# Patient Record
Sex: FEMALE | Race: WHITE | NOT HISPANIC OR LATINO | Employment: OTHER | URBAN - METROPOLITAN AREA
[De-identification: names, ages, dates, MRNs, and addresses within clinical notes are randomized per-mention and may not be internally consistent; named-entity substitution may affect disease eponyms.]

---

## 2017-01-02 ENCOUNTER — TELEPHONE (OUTPATIENT)
Dept: INTERNAL MEDICINE | Facility: CLINIC | Age: 77
End: 2017-01-02

## 2017-01-02 ENCOUNTER — INFUSION THERAPY VISIT (OUTPATIENT)
Dept: INFUSION THERAPY | Facility: CLINIC | Age: 77
End: 2017-01-02
Attending: INTERNAL MEDICINE
Payer: MEDICARE

## 2017-01-02 VITALS
BODY MASS INDEX: 19.12 KG/M2 | OXYGEN SATURATION: 99 % | RESPIRATION RATE: 18 BRPM | SYSTOLIC BLOOD PRESSURE: 107 MMHG | WEIGHT: 94.7 LBS | HEART RATE: 68 BPM | DIASTOLIC BLOOD PRESSURE: 61 MMHG | TEMPERATURE: 98 F

## 2017-01-02 DIAGNOSIS — C83.30 DIFFUSE LARGE B-CELL LYMPHOMA, UNSPECIFIED BODY REGION (H): ICD-10-CM

## 2017-01-02 DIAGNOSIS — Z94.81 STATUS POST BONE MARROW TRANSPLANT (H): ICD-10-CM

## 2017-01-02 DIAGNOSIS — M81.0 OSTEOPOROSIS: Primary | ICD-10-CM

## 2017-01-02 LAB
CALCIUM SERPL-MCNC: 9.6 MG/DL (ref 8.5–10.1)
CREAT SERPL-MCNC: 0.82 MG/DL (ref 0.52–1.04)
GFR SERPL CREATININE-BSD FRML MDRD: 68 ML/MIN/1.7M2

## 2017-01-02 PROCEDURE — 96365 THER/PROPH/DIAG IV INF INIT: CPT

## 2017-01-02 PROCEDURE — 36415 COLL VENOUS BLD VENIPUNCTURE: CPT | Performed by: INTERNAL MEDICINE

## 2017-01-02 PROCEDURE — 96366 THER/PROPH/DIAG IV INF ADDON: CPT

## 2017-01-02 PROCEDURE — 82565 ASSAY OF CREATININE: CPT | Performed by: INTERNAL MEDICINE

## 2017-01-02 PROCEDURE — 82310 ASSAY OF CALCIUM: CPT | Performed by: INTERNAL MEDICINE

## 2017-01-02 PROCEDURE — 25000128 H RX IP 250 OP 636: Performed by: INTERNAL MEDICINE

## 2017-01-02 RX ORDER — ZOLEDRONIC ACID 5 MG/100ML
5 INJECTION, SOLUTION INTRAVENOUS ONCE
Status: COMPLETED | OUTPATIENT
Start: 2017-01-02 | End: 2017-01-02

## 2017-01-02 RX ADMIN — SODIUM CHLORIDE 100 ML: 9 INJECTION, SOLUTION INTRAVENOUS at 13:57

## 2017-01-02 RX ADMIN — ZOLEDRONIC ACID 5 MG: 5 INJECTION, SOLUTION INTRAVENOUS at 13:40

## 2017-01-02 ASSESSMENT — PAIN SCALES - GENERAL: PAINLEVEL: NO PAIN (0)

## 2017-01-02 NOTE — TELEPHONE ENCOUNTER
----- Message from Brian Roman MD sent at 1/2/2017  1:08 PM CST -----  Labs are ok, continue as she is doing.

## 2017-01-02 NOTE — TELEPHONE ENCOUNTER
Patient called back and I gave her the message. She was happy to hear   Her results and had no questions at this time.       Thank you,  Savanna Olmstead   for [facility]

## 2017-01-02 NOTE — PROGRESS NOTES
Patient had labs drawn. Calcium-9.6 and Creat-0.82. Tolerated Boniva infusion well. Medication information sheet on Boniva given to patient. Discharged in stable condition.

## 2017-04-17 ENCOUNTER — TELEPHONE (OUTPATIENT)
Dept: INTERNAL MEDICINE | Facility: CLINIC | Age: 77
End: 2017-04-17

## 2017-05-02 DIAGNOSIS — C83.30 DIFFUSE LARGE B-CELL LYMPHOMA, UNSPECIFIED BODY REGION (H): ICD-10-CM

## 2017-05-02 LAB
ALBUMIN SERPL-MCNC: 3.8 G/DL (ref 3.4–5)
ALP SERPL-CCNC: 64 U/L (ref 40–150)
ALT SERPL W P-5'-P-CCNC: 21 U/L (ref 0–50)
ANION GAP SERPL CALCULATED.3IONS-SCNC: 8 MMOL/L (ref 3–14)
AST SERPL W P-5'-P-CCNC: 17 U/L (ref 0–45)
BASOPHILS # BLD AUTO: 0 10E9/L (ref 0–0.2)
BASOPHILS NFR BLD AUTO: 0.2 %
BILIRUB SERPL-MCNC: 0.5 MG/DL (ref 0.2–1.3)
BUN SERPL-MCNC: 15 MG/DL (ref 7–30)
CALCIUM SERPL-MCNC: 9.3 MG/DL (ref 8.5–10.1)
CHLORIDE SERPL-SCNC: 103 MMOL/L (ref 94–109)
CO2 SERPL-SCNC: 31 MMOL/L (ref 20–32)
CREAT SERPL-MCNC: 0.75 MG/DL (ref 0.52–1.04)
DIFFERENTIAL METHOD BLD: ABNORMAL
EOSINOPHIL # BLD AUTO: 0.1 10E9/L (ref 0–0.7)
EOSINOPHIL NFR BLD AUTO: 1.3 %
ERYTHROCYTE [DISTWIDTH] IN BLOOD BY AUTOMATED COUNT: 12.6 % (ref 10–15)
GFR SERPL CREATININE-BSD FRML MDRD: 75 ML/MIN/1.7M2
GLUCOSE SERPL-MCNC: 101 MG/DL (ref 70–99)
HCT VFR BLD AUTO: 41.6 % (ref 35–47)
HGB BLD-MCNC: 13.3 G/DL (ref 11.7–15.7)
IMM GRANULOCYTES # BLD: 0 10E9/L (ref 0–0.4)
IMM GRANULOCYTES NFR BLD: 0.2 %
LDH SERPL L TO P-CCNC: 188 U/L (ref 81–234)
LYMPHOCYTES # BLD AUTO: 1.5 10E9/L (ref 0.8–5.3)
LYMPHOCYTES NFR BLD AUTO: 27 %
MCH RBC QN AUTO: 32.9 PG (ref 26.5–33)
MCHC RBC AUTO-ENTMCNC: 32 G/DL (ref 31.5–36.5)
MCV RBC AUTO: 103 FL (ref 78–100)
MONOCYTES # BLD AUTO: 0.5 10E9/L (ref 0–1.3)
MONOCYTES NFR BLD AUTO: 9.4 %
NEUTROPHILS # BLD AUTO: 3.4 10E9/L (ref 1.6–8.3)
NEUTROPHILS NFR BLD AUTO: 61.9 %
PLATELET # BLD AUTO: 163 10E9/L (ref 150–450)
POTASSIUM SERPL-SCNC: 4.2 MMOL/L (ref 3.4–5.3)
PROT SERPL-MCNC: 6.9 G/DL (ref 6.8–8.8)
RBC # BLD AUTO: 4.04 10E12/L (ref 3.8–5.2)
SODIUM SERPL-SCNC: 142 MMOL/L (ref 133–144)
WBC # BLD AUTO: 5.5 10E9/L (ref 4–11)

## 2017-05-02 PROCEDURE — 80053 COMPREHEN METABOLIC PANEL: CPT | Performed by: INTERNAL MEDICINE

## 2017-05-02 PROCEDURE — 36415 COLL VENOUS BLD VENIPUNCTURE: CPT | Performed by: INTERNAL MEDICINE

## 2017-05-02 PROCEDURE — 85025 COMPLETE CBC W/AUTO DIFF WBC: CPT | Performed by: INTERNAL MEDICINE

## 2017-05-02 PROCEDURE — 83615 LACTATE (LD) (LDH) ENZYME: CPT | Performed by: INTERNAL MEDICINE

## 2017-05-09 ENCOUNTER — ONCOLOGY VISIT (OUTPATIENT)
Dept: ONCOLOGY | Facility: CLINIC | Age: 77
End: 2017-05-09
Payer: MEDICARE

## 2017-05-09 VITALS
WEIGHT: 93 LBS | HEIGHT: 59 IN | RESPIRATION RATE: 16 BRPM | HEART RATE: 86 BPM | SYSTOLIC BLOOD PRESSURE: 104 MMHG | TEMPERATURE: 97.1 F | BODY MASS INDEX: 18.75 KG/M2 | DIASTOLIC BLOOD PRESSURE: 64 MMHG | OXYGEN SATURATION: 99 %

## 2017-05-09 DIAGNOSIS — E89.0 POSTOPERATIVE HYPOTHYROIDISM: ICD-10-CM

## 2017-05-09 DIAGNOSIS — M81.0 OSTEOPOROSIS: ICD-10-CM

## 2017-05-09 DIAGNOSIS — C83.30 DIFFUSE LARGE B-CELL LYMPHOMA, UNSPECIFIED BODY REGION (H): Primary | ICD-10-CM

## 2017-05-09 PROCEDURE — 99214 OFFICE O/P EST MOD 30 MIN: CPT | Performed by: INTERNAL MEDICINE

## 2017-05-09 ASSESSMENT — PAIN SCALES - GENERAL: PAINLEVEL: MILD PAIN (3)

## 2017-05-09 NOTE — MR AVS SNAPSHOT
After Visit Summary   5/9/2017    Lilli Russo    MRN: [id number]           Patient Information     Date Of Birth          1940        Visit Information        Provider Department      5/9/2017 1:00 PM Wayne Perez MD [facility]        Today's Diagnoses     Diffuse large B-cell lymphoma, unspecified body region (H)    -  1    Osteoporosis        Postoperative hypothyroidism          Care Instructions      Please follow up in 6 months with labs.      Follow Up Date/Time:   Please come 15-30 minutes prior to follow up appointment for labs.    If you have any questions or concerns please feel free to call.    Parish Rodney, RN, BSN   Oncology Care Coordinator RN  [facility]  [phone]            Follow-ups after your visit        Follow-up notes from your care team     Return in about 6 months (around 11/9/2017) for Blood work before next appointment.      Your next 10 appointments already scheduled     Jun 09, 2017  1:00 PM CDT   Office Visit with Brian Roman MD   [facility] ([facility])    [address] [place] [id number] [phone]           Bring a current list of meds and any records pertaining to this visit.  For Physicals, please bring immunization records and any forms needing to be filled out.  Please arrive 10 minutes early to complete paperwork.              Future tests that were ordered for you today     Open Future Orders        Priority Expected Expires Ordered    CBC with platelets differential Routine 11/9/2017 5/9/2018 5/9/2017    Comprehensive metabolic panel Routine 11/9/2017 5/9/2018 5/9/2017    Lactate Dehydrogenase Routine 11/9/2017 5/9/2018 5/9/2017            Who to contact     If you have questions or need follow up information about today's clinic visit or your schedule please contact [facility] directly at [phone].  Normal or non-critical lab and imaging results will  "be communicated to you by MyChart, letter or phone within 4 business days after the clinic has received the results. If you do not hear from us within 7 days, please contact the clinic through [employer] or phone. If you have a critical or abnormal lab result, we will notify you by phone as soon as possible.  Submit refill requests through [employer] or call your pharmacy and they will forward the refill request to us. Please allow 3 business days for your refill to be completed.          Additional Information About Your Visit        Booster Information     Booster lets you send messages to your doctor, view your test results, renew your prescriptions, schedule appointments and more. To sign up, go to [email or web address] . Click on \"Log in\" on the left side of the screen, which will take you to the Welcome page. Then click on \"Sign up Now\" on the right side of the page.     You will be asked to enter the access code listed below, as well as some personal information. Please follow the directions to create your username and password.     Your access code is: [email or web address]  Expires: 2017  1:11 PM     Your access code will  in 90 days. If you need help or a new code, please call your [place] clinic or [phone].        Care EveryWhere ID     This is your Care EveryWhere ID. This could be used by other organizations to access your [place] medical records  [id number]        Your Vitals Were     Pulse Temperature Respirations Height Pulse Oximetry BMI (Body Mass Index)    86 97.1  F (36.2  C) (Temporal) 16 1.499 m (4' 11\") 99% 18.78 kg/m2       Blood Pressure from Last 3 Encounters:   17 104/64   17 107/61   16 104/66    Weight from Last 3 Encounters:   17 42.2 kg (93 lb)   17 43 kg (94 lb 11.2 oz)   16 43.1 kg (95 lb)               Primary Care Provider Office Phone # Fax #    Brian Roman [phone]       [address] " [address] [place] [address]        Thank you!     Thank you for choosing [facility]  for your care. Our goal is always to provide you with excellent care. Hearing back from our patients is one way we can continue to improve our services. Please take a few minutes to complete the written survey that you may receive in the mail after your visit with us. Thank you!             Your Updated Medication List - Protect others around you: Learn how to safely use, store and throw away your medicines at www.disposemymeds.org.          This list is accurate as of: 5/9/17  1:11 PM.  Always use your most recent med list.                   Brand Name Dispense Instructions for use    acetaminophen 325 MG tablet    TYLENOL    100 tablet    Take 2 tablets (650 mg) by mouth every 4 hours as needed for pain       levothyroxine 50 MCG tablet    SYNTHROID/LEVOTHROID    90 tablet    Take 1 tablet (50 mcg) by mouth daily       MULTIVITAMIN TABS   OR      1 TABLET DAILY       TUMS 500 MG chewable tablet   Generic drug:  calcium carbonate      Take 2 chew tab by mouth daily as needed       VITAMIN D-3 PO      Take 1,000 Units by mouth daily.

## 2017-05-09 NOTE — PROGRESS NOTES
Hematology/ Oncology Follow-up Visit:  May 9, 2017    Reason for Visit:   Chief Complaint   Patient presents with     Oncology Clinic Visit     6 month follow up for Diffuse large B-cell lymphoma     Results     Labs 5/5/2017       Oncologic History:  Diffuse large B cell lymphoma (H)  Lilli Russo is known with known history of Non Hodgkin's lymphoma since 2013 when she felt feverish, had chills, poor taste, fatigue, cough, not too much sputum production, indigestion, early satiety, night sweats.  She was found to have pancytopenia with white count 2.4, hemoglobin 10.2, MCV 92, platelets 74, absolute neutrophil count 0.9, diff not significant without premature cells..   CT chest, abdomen and pelvis 09/16/2013:  Mild splenomegaly.  Spleen measures 13.2 cm.  Further extensive infectious disease work up was negative. 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 She was seen by Dr. Fulelr and is offered RCHOP [id number]/26/2013 with IT prophylaxis 12 mg MTX  On 9/26 with negative LP and brain MRI. Her presenting LDH is high >900, she received 1 dose of Rasburicase on 9/28/2013. She had RCHOP from 10/2013 to 1/2014. Repeat BM 11/26/2013 - negative for lymphoma by morphology and flow, cyto is nl.  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         Interval History:  Patient is here today for follow-up. She has been feeling well without any significant complaints of new pain, nausea vomiting diarrhea. She denies any weight loss or fever or chills. She was recently diagnosed with osteoporosis. She was started on IV treatment for osteoporosis. Since then she's been having increasing jaw pain.    Review Of Systems:  Constitutional: Negative for fever, chills, and night sweats.  Skin: negative.  Eyes: negative.  Ears/Nose/Throat: Jaw pain as mentioned above  Respiratory: No shortness of breath, dyspnea on exertion, cough, or  "hemoptysis.  Cardiovascular: negative.  Gastrointestinal: negative.  Genitourinary: negative.  Musculoskeletal: negative.  Neurologic: negative.  Psychiatric: negative.  Hematologic/Lymphatic/Immunologic: negative.  Endocrine: negative.    All other ROS negative unless mentioned in interval history.    Past medical, social, surgical, and family histories reviewed.    Allergies:  Allergies as of 05/09/2017 - Rick as Reviewed 05/09/2017   Allergen Reaction Noted     Ceftazidime Rash 09/24/2013       Current Medications:  Current Outpatient Prescriptions   Medication Sig Dispense Refill     levothyroxine (SYNTHROID, LEVOTHROID) 50 MCG tablet Take 1 tablet (50 mcg) by mouth daily 90 tablet 3     calcium carbonate (TUMS) 500 MG chewable tablet Take 2 chew tab by mouth daily as needed       Cholecalciferol (VITAMIN D-3 PO) Take 1,000 Units by mouth daily.        acetaminophen (TYLENOL) 325 MG tablet Take 2 tablets (650 mg) by mouth every 4 hours as needed for pain 100 tablet 0     MULTIVITAMIN TABS   OR 1 TABLET DAILY          Physical Exam:  /64 (BP Location: Right arm, Patient Position: Chair, Cuff Size: Adult Regular)  Pulse 86  Temp 97.1  F (36.2  C) (Temporal)  Resp 16  Ht 1.499 m (4' 11\")  Wt 42.2 kg (93 lb)  SpO2 99%  BMI 18.78 kg/m2  Wt Readings from Last 12 Encounters:   05/09/17 42.2 kg (93 lb)   01/02/17 43 kg (94 lb 11.2 oz)   12/23/16 43.1 kg (95 lb)   11/08/16 42.2 kg (93 lb)   06/07/16 40.8 kg (90 lb)   05/12/16 41.3 kg (91 lb 1.6 oz)   05/05/16 41.3 kg (91 lb)   12/01/15 41.9 kg (92 lb 4.8 oz)   09/22/15 40.7 kg (89 lb 11.2 oz)   07/14/15 39.8 kg (87 lb 11.9 oz)   06/01/15 39.8 kg (87 lb 12.8 oz)   05/26/15 40.1 kg (88 lb 8 oz)     ECOG performance status: 0  GENERAL APPEARANCE: Healthy, alert and in no acute distress.  HEENT: Sclerae anicteric. PERRLA. Oropharynx without ulcers, lesions, or thrush.  NECK: Supple. No asymmetry or masses.  LYMPHATICS: No palpable cervical, supraclavicular, " axillary, or inguinal lymphadenopathy.  RESP: Lungs clear to auscultation bilaterally without rales, rhonchi or wheezes.  CARDIOVASCULAR: Regular rate and rhythm. Normal S1, S2; no S3 or S4. No murmur, gallop, or rub.  ABDOMEN: Soft, nontender. Bowel sounds normal. No palpable organomegaly or masses.  MUSCULOSKELETAL: Extremities without gross deformities noted. No edema of bilateral lower extremities.  SKIN: No suspicious lesions or rashes.  NEURO: Alert and oriented x 3. Cranial nerves II-XII grossly intact.  PSYCHIATRIC: Mentation and affect appear normal.    Laboratory/Imaging Studies:  Orders Only on 05/02/2017   Component Date Value Ref Range Status     WBC 05/02/2017 5.5  4.0 - 11.0 10e9/L Final     RBC Count 05/02/2017 4.04  3.8 - 5.2 10e12/L Final     Hemoglobin 05/02/2017 13.3  11.7 - 15.7 g/dL Final     Hematocrit 05/02/2017 41.6  35.0 - 47.0 % Final     MCV 05/02/2017 103* 78 - 100 fl Final     MCH 05/02/2017 32.9  26.5 - 33.0 pg Final     MCHC 05/02/2017 32.0  31.5 - 36.5 g/dL Final     RDW 05/02/2017 12.6  10.0 - 15.0 % Final     Platelet Count 05/02/2017 163  150 - 450 10e9/L Final     Diff Method 05/02/2017 Automated Method   Final     % Neutrophils 05/02/2017 61.9  % Final     % Lymphocytes 05/02/2017 27.0  % Final     % Monocytes 05/02/2017 9.4  % Final     % Eosinophils 05/02/2017 1.3  % Final     % Basophils 05/02/2017 0.2  % Final     % Immature Granulocytes 05/02/2017 0.2  % Final     Absolute Neutrophil 05/02/2017 3.4  1.6 - 8.3 10e9/L Final     Absolute Lymphocytes 05/02/2017 1.5  0.8 - 5.3 10e9/L Final     Absolute Monocytes 05/02/2017 0.5  0.0 - 1.3 10e9/L Final     Absolute Eosinophils 05/02/2017 0.1  0.0 - 0.7 10e9/L Final     Absolute Basophils 05/02/2017 0.0  0.0 - 0.2 10e9/L Final     Abs Immature Granulocytes 05/02/2017 0.0  0 - 0.4 10e9/L Final     Sodium 05/02/2017 142  133 - 144 mmol/L Final     Potassium 05/02/2017 4.2  3.4 - 5.3 mmol/L Final     Chloride 05/02/2017 103  94 - 109  mmol/L Final     Carbon Dioxide 05/02/2017 31  20 - 32 mmol/L Final     Anion Gap 05/02/2017 8  3 - 14 mmol/L Final     Glucose 05/02/2017 101* 70 - 99 mg/dL Final     Urea Nitrogen 05/02/2017 15  7 - 30 mg/dL Final     Creatinine 05/02/2017 0.75  0.52 - 1.04 mg/dL Final     GFR Estimate 05/02/2017 75  >60 mL/min/1.7m2 Final    Non  GFR Calc     GFR Estimate If Black 05/02/2017   >60 mL/min/1.7m2 Final                    Value:>90   GFR Calc       Calcium 05/02/2017 9.3  8.5 - 10.1 mg/dL Final     Bilirubin Total 05/02/2017 0.5  0.2 - 1.3 mg/dL Final     Albumin 05/02/2017 3.8  3.4 - 5.0 g/dL Final     Protein Total 05/02/2017 6.9  6.8 - 8.8 g/dL Final     Alkaline Phosphatase 05/02/2017 64  40 - 150 U/L Final     ALT 05/02/2017 21  0 - 50 U/L Final     AST 05/02/2017 17  0 - 45 U/L Final     Lactate Dehydrogenase 05/02/2017 188  81 - 234 U/L Final          Assessment and plan:    (C83.30) Diffuse large B-cell lymphoma, unspecified body region (H)  (primary encounter diagnosis)  I reviewed with the patient today the most recent blood work. There is no clinical evidence of lymphoma recurrence. Continue to monitor the patient's symptoms. I will see the patient again in 6 months or sooner if there is new developments or concerns.    (M81.0) Osteoporosis  Continue on calcium and vitamin D.    (E89.0) Postoperative hypothyroidism  Patient currently on Synthroid 50  g daily.    The patient is ready to learn, no apparent learning barriers were identified.  Diagnosis and treatment plans were explained to the patient. The patient expressed understanding of the content. The patient asked appropriate questions. The patient questions were answered to her satisfaction.    Chart documentation with Dragon Voice recognition Software. Although reviewed after completion, some words and grammatical errors may remain.

## 2017-05-09 NOTE — NURSING NOTE
DISCHARGE PLAN:  Next appointments: See patient instruction section  Departure Mode: Ambulatory  Accompanied by: self  5 minutes for nursing discharge (face to face time)     Lilli Russo is here today for 6 month Oncology follow up for Lymphoma.  Writing nurse seen patient after Medical Oncology appointment to address questions/concerns/coordinate care. Patient to follow up in 6 months with labs prior. Patient ambulated by nurse to  to schedule follow up and/or lab appointments. See patient instructions and Oncologist's Progress note for further details. Questions and concerns addressed to patient's satisfaction. Patient verbalized and demonstrated understanding of plan.  Contact information provided and patient is encouraged to call with any that arise in the interim of care.    Parish Rodney, RN, BSN, OCN   Oncology Care Coordinator RN  [facility]  [phone]  5/9/2017, 1:27 PM

## 2017-05-09 NOTE — ASSESSMENT & PLAN NOTE
Lilli Russo is known with known history of Non Hodgkin's lymphoma since 2013 when she felt feverish, had chills, poor taste, fatigue, cough, not too much sputum production, indigestion, early satiety, night sweats.  She was found to have pancytopenia with white count 2.4, hemoglobin 10.2, MCV 92, platelets 74, absolute neutrophil count 0.9, diff not significant without premature cells..   CT chest, abdomen and pelvis 09/16/2013:  Mild splenomegaly.  Spleen measures 13.2 cm.  Further extensive infectious disease work up was negative. 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 She was seen by Dr. Fuller and is offered RCHOP [id number]/26/2013 with IT prophylaxis 12 mg MTX  On 9/26 with negative LP and brain MRI. Her presenting LDH is high >900, she received 1 dose of Rasburicase on 9/28/2013. She had RCHOP from 10/2013 to 1/2014. Repeat BM 11/26/2013 - negative for lymphoma by morphology and flow, cyto is nl.  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

## 2017-05-09 NOTE — PATIENT INSTRUCTIONS
Please follow up in 6 months with labs.      Follow Up Date/Time:   Please come 15-30 minutes prior to follow up appointment for labs.    If you have any questions or concerns please feel free to call.    Parish Rodney RN, BSN   Oncology Care Coordinator RN  [facility]  [phone]

## 2017-05-09 NOTE — NURSING NOTE
"Oncology Rooming Note    May 9, 2017 12:53 PM   Lilli Russo is a 76 year old female who presents for:    Chief Complaint   Patient presents with     Oncology Clinic Visit     6 month follow up for Diffuse large B-cell lymphoma     Results     Labs 5/5/2017     Initial Vitals: /64 (BP Location: Right arm, Patient Position: Chair, Cuff Size: Adult Regular)  Pulse 86  Temp 97.1  F (36.2  C) (Temporal)  Resp 16  Ht 1.499 m (4' 11\")  Wt 42.2 kg (93 lb)  SpO2 99%  BMI 18.78 kg/m2 Estimated body mass index is 18.78 kg/(m^2) as calculated from the following:    Height as of this encounter: 1.499 m (4' 11\").    Weight as of this encounter: 42.2 kg (93 lb). Body surface area is 1.33 meters squared.  Mild Pain (3) Comment: Neck/Hips/Jaw/Shoulders   No LMP recorded. Patient is postmenopausal.  Allergies reviewed: Yes  Medications reviewed: Yes    Medications: Medication refills not needed today.  Pharmacy name entered into [facility]: [facility] - [place], [place] - [address]    Clinical concerns: None.    Janel Castro MA              "

## 2017-06-09 ENCOUNTER — OFFICE VISIT (OUTPATIENT)
Dept: INTERNAL MEDICINE | Facility: CLINIC | Age: 77
End: 2017-06-09
Payer: MEDICARE

## 2017-06-09 ENCOUNTER — TELEPHONE (OUTPATIENT)
Dept: INTERNAL MEDICINE | Facility: CLINIC | Age: 77
End: 2017-06-09

## 2017-06-09 VITALS
HEART RATE: 82 BPM | HEIGHT: 59 IN | WEIGHT: 92.6 LBS | BODY MASS INDEX: 18.67 KG/M2 | DIASTOLIC BLOOD PRESSURE: 62 MMHG | SYSTOLIC BLOOD PRESSURE: 100 MMHG | RESPIRATION RATE: 18 BRPM | OXYGEN SATURATION: 98 % | TEMPERATURE: 96.9 F

## 2017-06-09 DIAGNOSIS — D84.9 IMMUNOCOMPROMISED (H): ICD-10-CM

## 2017-06-09 DIAGNOSIS — M81.0 OSTEOPOROSIS: ICD-10-CM

## 2017-06-09 DIAGNOSIS — E89.0 POSTOPERATIVE HYPOTHYROIDISM: Primary | ICD-10-CM

## 2017-06-09 DIAGNOSIS — Z94.81 STATUS POST BONE MARROW TRANSPLANT (H): ICD-10-CM

## 2017-06-09 LAB
T4 FREE SERPL-MCNC: 1.12 NG/DL (ref 0.76–1.46)
TSH SERPL DL<=0.05 MIU/L-ACNC: 4.57 MU/L (ref 0.4–4)

## 2017-06-09 PROCEDURE — 99214 OFFICE O/P EST MOD 30 MIN: CPT | Performed by: INTERNAL MEDICINE

## 2017-06-09 PROCEDURE — 36415 COLL VENOUS BLD VENIPUNCTURE: CPT | Performed by: INTERNAL MEDICINE

## 2017-06-09 PROCEDURE — 84439 ASSAY OF FREE THYROXINE: CPT | Performed by: INTERNAL MEDICINE

## 2017-06-09 PROCEDURE — 84443 ASSAY THYROID STIM HORMONE: CPT | Performed by: INTERNAL MEDICINE

## 2017-06-09 RX ORDER — LEVOTHYROXINE SODIUM 50 UG/1
50 TABLET ORAL DAILY
Qty: 90 TABLET | Refills: 3 | Status: SHIPPED | OUTPATIENT
Start: 2017-06-09 | End: 2018-06-11

## 2017-06-09 NOTE — MR AVS SNAPSHOT
After Visit Summary   6/9/2017    Lilli Russo    MRN: [id number]           Patient Information     Date Of Birth          1940        Visit Information        Provider Department      6/9/2017 1:00 PM Brian Roman MD [facility]         Follow-ups after your visit        Your next 10 appointments already scheduled     Jun 09, 2017  1:00 PM CDT   Office Visit with Brian Roman MD   [facility] ([address] [id number] [phone]           Bring a current list of meds and any records pertaining to this visit.  For Physicals, please bring immunization records and any forms needing to be filled out.  Please arrive 10 minutes early to complete paperwork.            Nov 07, 2017  1:00 PM CST   Return Visit with Wayne Perez MD   [facility] ([address] [phone]              Who to contact     If you have questions or need follow up information about today's clinic visit or your schedule please contact [facility] directly at [phone].  Normal or non-critical lab and imaging results will be communicated to you by MyChart, letter or phone within 4 business days after the clinic has received the results. If you do not hear from us within 7 days, please contact the clinic through [employer] or phone. If you have a critical or abnormal lab result, we will notify you by phone as soon as possible.  Submit refill requests through "[employer] or call your pharmacy and they will forward the refill request to us. Please allow 3 business days for your refill to be completed.          Additional Information About Your Visit        MyChart Information     "[employer] lets you send messages to your doctor, view your test results, renew your prescriptions, schedule appointments and more. To sign up, go to [email or web address]  ". Click on \"Log in\" on the left side of the screen, which will take you to the Welcome page. Then click on \"Sign up Now\" on the right side of the page.     You will be asked to enter the access code listed below, as well as some personal information. Please follow the directions to create your username and password.     Your access code is: [email or web address]  Expires: 2017  1:11 PM     Your access code will  in 90 days. If you need help or a new code, please call your [facility] or [phone].        Care EveryWhere ID     This is your Care EveryWhere ID. This could be used by other organizations to access your [place] medical records  [id number]        Your Vitals Were     Pulse Temperature Respirations Height Pulse Oximetry BMI (Body Mass Index)    82 96.9  F (36.1  C) (Temporal) 18 4' 11\" (1.499 m) 98% 18.7 kg/m2       Blood Pressure from Last 3 Encounters:   17 100/62   17 104/64   17 107/61    Weight from Last 3 Encounters:   17 92 lb 9.6 oz (42 kg)   17 93 lb (42.2 kg)   17 94 lb 11.2 oz (43 kg)              Today, you had the following     No orders found for display       Primary Care Provider Office Phone # Fax #    Brian Roman [phone]       [facility] [address] [place] [address]        Thank you!     Thank you for choosing [facility]  for your care. Our goal is always to provide you with excellent care. Hearing back from our patients is one way we can continue to improve our services. Please take a few minutes to complete the written survey that you may receive in the mail after your visit with us. Thank you!             Your Updated Medication List - Protect others around you: Learn how to safely use, store and throw away your medicines at www.disposemymeds.org.          This list is accurate as of: 17 12:59 PM.  Always use your most recent med list.                   Brand Name Dispense " Instructions for use    acetaminophen 325 MG tablet    TYLENOL    100 tablet    Take 2 tablets (650 mg) by mouth every 4 hours as needed for pain       levothyroxine 50 MCG tablet    SYNTHROID/LEVOTHROID    90 tablet    Take 1 tablet (50 mcg) by mouth daily       MULTIVITAMIN TABS   OR      1 TABLET DAILY       TUMS 500 MG chewable tablet   Generic drug:  calcium carbonate      Take 2 chew tab by mouth daily as needed       VITAMIN D-3 PO      Take 1,000 Units by mouth daily.

## 2017-06-09 NOTE — TELEPHONE ENCOUNTER
----- Message from Brian Roman MD sent at 6/9/2017  2:06 PM CDT -----  Thyroid is ok, continue as she is doing.

## 2017-06-09 NOTE — PROGRESS NOTES
"Lilli Russo is a 76 year old female who presents with recheck of things.  Jaw pain started in March, seems to come and go, worse with eating.  Has seen her dentist and thought could be arthritis or TMJ.  Teeth were ok, .sometimes can eat pretty well, sometimes worse when chewing,     Needs to have thyroid medication refilled.  She is taking it each morning on empty stomach, needs a renewal.      Past Medical History:   Diagnosis Date     Cancer (H) 5/2014    lymphoma, large b cell, stem cell transplant     Macular degeneration (senile) of retina, unspecified      Osteoporosis, unspecified      Pure hypercholesterolemia      Unspecified closed fracture of pelvis 01/12/09     Unspecified hypothyroidism      Current Outpatient Prescriptions   Medication     levothyroxine (SYNTHROID, LEVOTHROID) 50 MCG tablet     calcium carbonate (TUMS) 500 MG chewable tablet     Cholecalciferol (VITAMIN D-3 PO)     acetaminophen (TYLENOL) 325 MG tablet     MULTIVITAMIN TABS   OR     No current facility-administered medications for this visit.      Review of Systems  Constitutional-No fevers, chills, or weight changes..  Cardiac-No chest pain or palpitations.  Respiratory-No cough, sob, or hemoptysis.  GI-No nausea, vomitting, diarrhea, constipation, or blood in the stool.  Musculoskeletal-No muscles aches or joint pains.    Physical Exam  /62  Pulse 82  Temp 96.9  F (36.1  C) (Temporal)  Resp 18  Ht 4' 11\" (1.499 m)  Wt 92 lb 9.6 oz (42 kg)  SpO2 98%  BMI 18.7 kg/m2  General Appearance-healthy, alert, no distress  Jaw is nontender, normal movement,   Neck has thyroid scar  Cardiac-regular rate and rhythm  with normal S1, S2 ; no murmur, rub or gallops  Lungs-clear to auscultation  GI-Soft, nontender.  Normal bowel sounds.  No hepatosplenomegaly or abnormal masses  Extremities-no peripheral edema, peripheral pulses normal    ASSESSMENT:  Patient has some jaw arthritis or TMJ. I do not think this is related to her " bisphosphonate injection. We discussed her trying a dental device to help but she is clinching or grinding her teeth. She has already seen her dentist and her teeth are stable. She can also use ibuprofen. Usually there is not a lot of other treatments or TMJ.    Hypothyroidism from surgery. Her scar is stable no nodules noted will check her TSH and free T4 today and refill her thyroid medication.    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    Electronically signed by Brian Roman MD        Electronically signed by Brian Roman MD

## 2017-06-09 NOTE — NURSING NOTE
"Chief Complaint   Patient presents with     RECHECK     thyroid med check and osteporosis       Initial /62  Pulse 82  Temp 96.9  F (36.1  C) (Temporal)  Resp 18  Ht 4' 11\" (1.499 m)  Wt 92 lb 9.6 oz (42 kg)  SpO2 98%  BMI 18.7 kg/m2 Estimated body mass index is 18.7 kg/(m^2) as calculated from the following:    Height as of this encounter: 4' 11\" (1.499 m).    Weight as of this encounter: 92 lb 9.6 oz (42 kg).  Medication Reconciliation: complete   Keenan POST CMA      "

## 2017-08-22 ENCOUNTER — HOSPITAL ENCOUNTER (OUTPATIENT)
Dept: MAMMOGRAPHY | Facility: CLINIC | Age: 77
Discharge: HOME | End: 2017-08-22
Attending: INTERNAL MEDICINE | Admitting: INTERNAL MEDICINE
Payer: MEDICARE

## 2017-08-22 DIAGNOSIS — Z12.31 VISIT FOR SCREENING MAMMOGRAM: ICD-10-CM

## 2017-08-22 PROCEDURE — G0202 SCR MAMMO BI INCL CAD: HCPCS

## 2017-08-31 ENCOUNTER — HOSPITAL ENCOUNTER (OUTPATIENT)
Dept: ULTRASOUND IMAGING | Facility: CLINIC | Age: 77
End: 2017-08-31
Attending: INTERNAL MEDICINE
Payer: MEDICARE

## 2017-08-31 ENCOUNTER — HOSPITAL ENCOUNTER (OUTPATIENT)
Dept: MAMMOGRAPHY | Facility: CLINIC | Age: 77
Discharge: HOME | End: 2017-08-31
Attending: INTERNAL MEDICINE | Admitting: INTERNAL MEDICINE
Payer: MEDICARE

## 2017-08-31 DIAGNOSIS — R92.8 ABNORMAL MAMMOGRAM: ICD-10-CM

## 2017-08-31 PROCEDURE — 76642 ULTRASOUND BREAST LIMITED: CPT | Mod: LT

## 2017-08-31 PROCEDURE — G0206 DX MAMMO INCL CAD UNI: HCPCS

## 2017-09-05 ENCOUNTER — TELEPHONE (OUTPATIENT)
Dept: INTERNAL MEDICINE | Facility: CLINIC | Age: 77
End: 2017-09-05

## 2017-09-05 DIAGNOSIS — R92.8 ABNORMAL MAMMOGRAM: Primary | ICD-10-CM

## 2017-09-05 NOTE — TELEPHONE ENCOUNTER
** Patient Call Attempt With Normal Lab or Test Results**    I have attempted to contact this patient by phone with the following results: left message to return my call on answering machine.    When patient returns call, please advise of the following result.  If patient has further questions or concerns route call back to team, thank you.    Pt was given ultrasound results. Please make sure she has the 3 month follow up set up for a breast ultrasound.    Order placed for future ultrasound.    Kacie Priest CMA ([facility])

## 2017-11-07 ENCOUNTER — ONCOLOGY VISIT (OUTPATIENT)
Dept: ONCOLOGY | Facility: CLINIC | Age: 77
End: 2017-11-07
Payer: MEDICARE

## 2017-11-07 VITALS
HEIGHT: 59 IN | TEMPERATURE: 96.6 F | HEART RATE: 68 BPM | WEIGHT: 87.6 LBS | DIASTOLIC BLOOD PRESSURE: 71 MMHG | SYSTOLIC BLOOD PRESSURE: 110 MMHG | RESPIRATION RATE: 16 BRPM | OXYGEN SATURATION: 99 % | BODY MASS INDEX: 17.66 KG/M2

## 2017-11-07 DIAGNOSIS — C83.30 DIFFUSE LARGE B-CELL LYMPHOMA, UNSPECIFIED BODY REGION (H): ICD-10-CM

## 2017-11-07 DIAGNOSIS — E89.0 POSTOPERATIVE HYPOTHYROIDISM: Primary | ICD-10-CM

## 2017-11-07 LAB
ALBUMIN SERPL-MCNC: 3.8 G/DL (ref 3.4–5)
ALP SERPL-CCNC: 62 U/L (ref 40–150)
ALT SERPL W P-5'-P-CCNC: 23 U/L (ref 0–50)
ANION GAP SERPL CALCULATED.3IONS-SCNC: 6 MMOL/L (ref 3–14)
AST SERPL W P-5'-P-CCNC: 22 U/L (ref 0–45)
BASOPHILS # BLD AUTO: 0 10E9/L (ref 0–0.2)
BASOPHILS NFR BLD AUTO: 0.2 %
BILIRUB SERPL-MCNC: 0.5 MG/DL (ref 0.2–1.3)
BUN SERPL-MCNC: 14 MG/DL (ref 7–30)
CALCIUM SERPL-MCNC: 9.1 MG/DL (ref 8.5–10.1)
CHLORIDE SERPL-SCNC: 101 MMOL/L (ref 94–109)
CO2 SERPL-SCNC: 31 MMOL/L (ref 20–32)
CREAT SERPL-MCNC: 0.75 MG/DL (ref 0.52–1.04)
DIFFERENTIAL METHOD BLD: ABNORMAL
EOSINOPHIL # BLD AUTO: 0.1 10E9/L (ref 0–0.7)
EOSINOPHIL NFR BLD AUTO: 1 %
ERYTHROCYTE [DISTWIDTH] IN BLOOD BY AUTOMATED COUNT: 12.6 % (ref 10–15)
GFR SERPL CREATININE-BSD FRML MDRD: 75 ML/MIN/1.7M2
GLUCOSE SERPL-MCNC: 93 MG/DL (ref 70–99)
HCT VFR BLD AUTO: 42.5 % (ref 35–47)
HGB BLD-MCNC: 13.7 G/DL (ref 11.7–15.7)
IMM GRANULOCYTES # BLD: 0 10E9/L (ref 0–0.4)
IMM GRANULOCYTES NFR BLD: 0.2 %
LDH SERPL L TO P-CCNC: 182 U/L (ref 81–234)
LYMPHOCYTES # BLD AUTO: 2 10E9/L (ref 0.8–5.3)
LYMPHOCYTES NFR BLD AUTO: 33.7 %
MCH RBC QN AUTO: 32.9 PG (ref 26.5–33)
MCHC RBC AUTO-ENTMCNC: 32.2 G/DL (ref 31.5–36.5)
MCV RBC AUTO: 102 FL (ref 78–100)
MONOCYTES # BLD AUTO: 0.5 10E9/L (ref 0–1.3)
MONOCYTES NFR BLD AUTO: 9 %
NEUTROPHILS # BLD AUTO: 3.3 10E9/L (ref 1.6–8.3)
NEUTROPHILS NFR BLD AUTO: 55.9 %
PLATELET # BLD AUTO: 149 10E9/L (ref 150–450)
POTASSIUM SERPL-SCNC: 4.4 MMOL/L (ref 3.4–5.3)
PROT SERPL-MCNC: 7.2 G/DL (ref 6.8–8.8)
RBC # BLD AUTO: 4.16 10E12/L (ref 3.8–5.2)
SODIUM SERPL-SCNC: 138 MMOL/L (ref 133–144)
WBC # BLD AUTO: 5.9 10E9/L (ref 4–11)

## 2017-11-07 PROCEDURE — 85025 COMPLETE CBC W/AUTO DIFF WBC: CPT | Performed by: INTERNAL MEDICINE

## 2017-11-07 PROCEDURE — 99214 OFFICE O/P EST MOD 30 MIN: CPT | Performed by: INTERNAL MEDICINE

## 2017-11-07 PROCEDURE — 36415 COLL VENOUS BLD VENIPUNCTURE: CPT | Performed by: INTERNAL MEDICINE

## 2017-11-07 PROCEDURE — 80053 COMPREHEN METABOLIC PANEL: CPT | Performed by: INTERNAL MEDICINE

## 2017-11-07 PROCEDURE — 83615 LACTATE (LD) (LDH) ENZYME: CPT | Performed by: INTERNAL MEDICINE

## 2017-11-07 ASSESSMENT — PAIN SCALES - GENERAL: PAINLEVEL: NO PAIN (0)

## 2017-11-07 NOTE — PATIENT INSTRUCTIONS
Please follow up with Dr. Perez in 6 months.  You will need labs prior to your appointment.    Lab appointment date/time: 5/8/2018  @ 12:45pm                                              Follow up appointment date/time: 5/8/2018 @ 1:00pm    If you need to reschedule or have scheduling questions please call scheduling at [phone].    Keep in mind to have any labs or scans that are needed done prior to your Oncology visit.      If you have any questions or concerns please feel free to call.    Janel Castro, [facility]  Oncology Clinic  [facility]  [phone]

## 2017-11-07 NOTE — ASSESSMENT & PLAN NOTE
Lilli Russo is known with known history of Non Hodgkin's lymphoma since 2013 when she felt feverish, had chills, poor taste, fatigue, cough, not too much sputum production, indigestion, early satiety, night sweats.  She was found to have pancytopenia with white count 2.4, hemoglobin 10.2, MCV 92, platelets 74, absolute neutrophil count 0.9, diff not significant without premature cells..   CT chest, abdomen and pelvis 09/16/2013:  Mild splenomegaly.  Spleen measures 13.2 cm.  Further extensive infectious disease work up was negative. 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 She was seen by Dr. Fuller and is offered RCHOP [id number]/26/2013 with IT prophylaxis 12 mg MTX  On 9/26 with negative LP and brain MRI. Her presenting LDH is high >900, she received 1 dose of Rasburicase on 9/28/2013. She had RCHOP from 10/2013 to 1/2014. Repeat BM 11/26/2013 - negative for lymphoma by morphology and flow, cyto is nl.  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

## 2017-11-07 NOTE — LETTER
11/7/2017         RE: Lilli Russo  [address] [facility] [address]        Dear Colleague,    Thank you for referring your patient, Lilli Russo, to the [facility]. Please see a copy of my visit note below.    Hematology/ Oncology Follow-up Visit:  Nov 7, 2017    Reason for Visit:   Chief Complaint   Patient presents with     Oncology Clinic Visit     6 month follow up for Diffuse large B-cell lymphoma, unspecified body region      Results     Labs today       Oncologic History:  Diffuse large B cell lymphoma (H)  Lilli Russo is known with known history of Non Hodgkin's lymphoma since 2013 when she felt feverish, had chills, poor taste, fatigue, cough, not too much sputum production, indigestion, early satiety, night sweats.  She was found to have pancytopenia with white count 2.4, hemoglobin 10.2, MCV 92, platelets 74, absolute neutrophil count 0.9, diff not significant without premature cells..   CT chest, abdomen and pelvis 09/16/2013:  Mild splenomegaly.  Spleen measures 13.2 cm.  Further extensive infectious disease work up was negative. 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 She was seen by Dr. Fuller and is offered RCHOP [id number]/26/2013 with IT prophylaxis 12 mg MTX  On 9/26 with negative LP and brain MRI. Her presenting LDH is high >900, she received 1 dose of Rasburicase on 9/28/2013. She had RCHOP from 10/2013 to 1/2014. Repeat BM 11/26/2013 - negative for lymphoma by morphology and flow, cyto is nl.  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       Interval History:  Patient is here today for follow-up. She has been feeling well without any recent complaints of weight loss or night sweats or fever or chills. She denies any shortness of breath or cough or wheezing. She denies any abdominal pain or nausea or vomiting     Review Of Systems:  Constitutional: Negative for fever,  "chills, and night sweats.  Skin: negative.  Eyes: negative.  Ears/Nose/Throat: negative.  Respiratory: No shortness of breath, dyspnea on exertion, cough, or hemoptysis.  Cardiovascular: negative.  Gastrointestinal: negative.  Genitourinary: negative.  Musculoskeletal: negative.  Neurologic: negative.  Psychiatric: negative.  Hematologic/Lymphatic/Immunologic: negative.  Endocrine: negative.    All other ROS negative unless mentioned in interval history.    Past medical, social, surgical, and family histories reviewed.    Allergies:  Allergies as of 11/07/2017 - Rick as Reviewed 11/07/2017   Allergen Reaction Noted     Ceftazidime Rash 09/24/2013       Current Medications:  Current Outpatient Prescriptions   Medication Sig Dispense Refill     levothyroxine (SYNTHROID/LEVOTHROID) 50 MCG tablet Take 1 tablet (50 mcg) by mouth daily 90 tablet 3     calcium carbonate (TUMS) 500 MG chewable tablet Take 2 chew tab by mouth daily as needed       Cholecalciferol (VITAMIN D-3 PO) Take 1,000 Units by mouth daily.        acetaminophen (TYLENOL) 325 MG tablet Take 2 tablets (650 mg) by mouth every 4 hours as needed for pain 100 tablet 0     MULTIVITAMIN TABS   OR 1 TABLET DAILY          Physical Exam:  /71 (BP Location: Right arm, Patient Position: Chair, Cuff Size: Adult Regular)  Pulse 68  Temp 96.6  F (35.9  C) (Temporal)  Resp 16  Ht 1.499 m (4' 11\")  Wt 39.7 kg (87 lb 9.6 oz)  SpO2 99%  BMI 17.69 kg/m2  Wt Readings from Last 12 Encounters:   11/07/17 39.7 kg (87 lb 9.6 oz)   06/09/17 42 kg (92 lb 9.6 oz)   05/09/17 42.2 kg (93 lb)   01/02/17 43 kg (94 lb 11.2 oz)   12/23/16 43.1 kg (95 lb)   11/08/16 42.2 kg (93 lb)   06/07/16 40.8 kg (90 lb)   05/12/16 41.3 kg (91 lb 1.6 oz)   05/05/16 41.3 kg (91 lb)   12/01/15 41.9 kg (92 lb 4.8 oz)   09/22/15 40.7 kg (89 lb 11.2 oz)   07/14/15 39.8 kg (87 lb 11.9 oz)     ECOG performance status: 0  GENERAL APPEARANCE: Healthy, alert and in no acute distress.  HEENT: " Sclerae anicteric. PERRLA. Oropharynx without ulcers, lesions, or thrush.  NECK: Supple. No asymmetry or masses.  LYMPHATICS: No palpable cervical, supraclavicular, axillary, or inguinal lymphadenopathy.  RESP: Lungs clear to auscultation bilaterally without rales, rhonchi or wheezes.  CARDIOVASCULAR: Regular rate and rhythm. Normal S1, S2; no S3 or S4. No murmur, gallop, or rub.  ABDOMEN: Soft, nontender. Bowel sounds normal. No palpable organomegaly or masses.  MUSCULOSKELETAL: Extremities without gross deformities noted. No edema of bilateral lower extremities.  SKIN: No suspicious lesions or rashes.  NEURO: Alert and oriented x 3. Cranial nerves II-XII grossly intact.  PSYCHIATRIC: Mentation and affect appear normal.    Laboratory/Imaging Studies:  No visits with results within 2 Week(s) from this visit.  Latest known visit with results is:    Office Visit on 06/09/2017   Component Date Value Ref Range Status     TSH 06/09/2017 4.57* 0.40 - 4.00 mU/L Final     T4 Free 06/09/2017 1.12  0.76 - 1.46 ng/dL Final        No results found for this or any previous visit (from the past 744 hour(s)).    Assessment and plan:    (C83.30) Diffuse large B-cell lymphoma, unspecified body region (H)  I reviewed with the patient today most recent laboratory tests. There is no evidence of disease recurrence. We will see the patient again in 6 months or sooner if there are new developments or concerns.    (E89.0) Postoperative hypothyroidism  (primary encounter diagnosis)  Patient will continue on Synthroid     The patient is ready to learn, no apparent learning barriers were identified.  Diagnosis and treatment plans were explained to the patient. The patient expressed understanding of the content. The patient asked appropriate questions. The patient questions were answered to her satisfaction.    Chart documentation with Dragon Voice recognition Software. Although reviewed after completion, some words and grammatical errors may  remain.    Again, thank you for allowing me to participate in the care of your patient.        Sincerely,        Wayne Perez MD

## 2017-11-07 NOTE — MR AVS SNAPSHOT
After Visit Summary   11/7/2017    Lilli Russo    MRN: [id number]           Patient Information     Date Of Birth          1940        Visit Information        Provider Department      11/7/2017 1:00 PM Wayne Perez MD [facility]        Today's Diagnoses     Postoperative hypothyroidism    -  1    Diffuse large B-cell lymphoma, unspecified body region (H)          Care Instructions      Please follow up with Dr. Perez in 6 months.  You will need labs prior to your appointment.    Lab appointment date/time: 5/8/2018  @ 12:45pm                                              Follow up appointment date/time: 5/8/2018 @ 1:00pm    If you need to reschedule or have scheduling questions please call scheduling at [phone].    Keep in mind to have any labs or scans that are needed done prior to your Oncology visit.      If you have any questions or concerns please feel free to call.    Janel Castro, [facility]  Oncology Clinic  [facility]  [phone]                  Follow-ups after your visit        Follow-up notes from your care team     Return in about 6 months (around 5/7/2018) for Blood work before next appointment.      Your next 10 appointments already scheduled     Dec 05, 2017  1:30 PM CST   US BREAST LEFT LIMITED 1-3 QUAD with PHUS2, PH RAD   [facility] Ultrasound ([facility])    [address] [place] [id number] [phone]           Please bring a list of your medicines (including vitamins, minerals and over-the-counter drugs). Also, tell your doctor about any allergies you may have. Wear comfortable clothes and leave your valuables at home.  You do not need to do anything special to prepare for your exam.  Please call the Imaging Department at your exam site with any questions.            May 08, 2018  1:00 PM CDT   Return Visit with Wayne Perez MD   [facility]    [address] [address]   [phone]              Future tests that were ordered for you today     Open Future Orders        Priority Expected Expires Ordered    CBC with platelets differential Routine 2018    Comprehensive metabolic panel Routine 2018    Lactate Dehydrogenase Routine 2018            Who to contact     If you have questions or need follow up information about today's clinic visit or your schedule please contact [facility] directly at [phone].  Normal or non-critical lab and imaging results will be communicated to you by FRAMEDhart, letter or phone within 4 business days after the clinic has received the results. If you do not hear from us within 7 days, please contact the clinic through FRAMEDhart or phone. If you have a critical or abnormal lab result, we will notify you by phone as soon as possible.  Submit refill requests through VSporto or call your pharmacy and they will forward the refill request to us. Please allow 3 business days for your refill to be completed.          Additional Information About Your Visit        [facility] Information     VSporto lets you send messages to your doctor, view your test results, renew your prescriptions, schedule appointments and more. To sign up, go to [email or web address] . Click on \"Log in\" on the left side of the screen, which will take you to the Welcome page. Then click on \"Sign up Now\" on the right side of the page.     You will be asked to enter the access code listed below, as well as some personal information. Please follow the directions to create your username and password.     Your access code is: [email or web address]  Expires: 2018  1:16 PM     Your access code will  in 90 days. If you need help or a new code, please call your [facility] or [phone].        Care EveryWhere ID     This is your Care EveryWhere ID. This could be " "used by other organizations to access your [place] medical records  [id number]        Your Vitals Were     Pulse Temperature Respirations Height Pulse Oximetry BMI (Body Mass Index)    68 96.6  F (35.9  C) (Temporal) 16 1.499 m (4' 11\") 99% 17.69 kg/m2       Blood Pressure from Last 3 Encounters:   11/07/17 110/71   06/09/17 100/62   05/09/17 104/64    Weight from Last 3 Encounters:   11/07/17 39.7 kg (87 lb 9.6 oz)   06/09/17 42 kg (92 lb 9.6 oz)   05/09/17 42.2 kg (93 lb)              We Performed the Following     CBC with platelets differential     Comprehensive metabolic panel     Lactate Dehydrogenase        Primary Care Provider Office Phone # Fax #    Brian Roman [phone]       [facility] [address] [place] [address]        Equal Access to Services     NGUYỄN ROBERTO : Hadii aad ku hadasho Soomaali, waaxda luqadaha, qaybta kaalmada adeegyada, waxay idiin hayrolandon diana estrella . So [facility] [phone].    ATENCIÓN: Si habla español, tiene a weller disposición servicios gratuitos de asistencia lingüística. Llame al [phone].    We comply with applicable federal civil rights laws and [place] laws. We do not discriminate on the basis of race, color, national origin, age, disability, sex, sexual orientation, or gender identity.            Thank you!     Thank you for choosing [facility]  for your care. Our goal is always to provide you with excellent care. Hearing back from our patients is one way we can continue to improve our services. Please take a few minutes to complete the written survey that you may receive in the mail after your visit with us. Thank you!             Your Updated Medication List - Protect others around you: Learn how to safely use, store and throw away your medicines at www.disposemymeds.org.          This list is accurate as of: 11/7/17  1:21 PM.  Always use your most recent med list.                   Brand Name " Dispense Instructions for use Diagnosis    acetaminophen 325 MG tablet    TYLENOL    100 tablet    Take 2 tablets (650 mg) by mouth every 4 hours as needed for pain        levothyroxine 50 MCG tablet    SYNTHROID/LEVOTHROID    90 tablet    Take 1 tablet (50 mcg) by mouth daily    Postoperative hypothyroidism       MULTIVITAMIN TABS   OR      1 TABLET DAILY        TUMS 500 MG chewable tablet   Generic drug:  calcium carbonate      Take 2 chew tab by mouth daily as needed        VITAMIN D-3 PO      Take 1,000 Units by mouth daily.

## 2017-11-07 NOTE — NURSING NOTE
"Oncology Rooming Note    November 7, 2017 1:02 PM   Lilli Russo is a 77 year old female who presents for:    Chief Complaint   Patient presents with     Oncology Clinic Visit     6 month follow up for Diffuse large B-cell lymphoma, unspecified body region      Results     Labs today     Initial Vitals: /71 (BP Location: Right arm, Patient Position: Chair, Cuff Size: Adult Regular)  Pulse 68  Temp 96.6  F (35.9  C) (Temporal)  Resp 16  Ht 1.499 m (4' 11\")  Wt 39.7 kg (87 lb 9.6 oz)  SpO2 99%  BMI 17.69 kg/m2 Estimated body mass index is 17.69 kg/(m^2) as calculated from the following:    Height as of this encounter: 1.499 m (4' 11\").    Weight as of this encounter: 39.7 kg (87 lb 9.6 oz). Body surface area is 1.29 meters squared.  No Pain (0) Comment: Data Unavailable   No LMP recorded. Patient is postmenopausal.  Allergies reviewed: Yes  Medications reviewed: Yes    Medications: Medication refills not needed today.  Pharmacy name entered into [facility]: [facility] - [place], [place] - [address]    Clinical concerns: None. Dr. Perez was notified.    Janel Castro MA              "

## 2017-11-07 NOTE — PROGRESS NOTES
Hematology/ Oncology Follow-up Visit:  Nov 7, 2017    Reason for Visit:   Chief Complaint   Patient presents with     Oncology Clinic Visit     6 month follow up for Diffuse large B-cell lymphoma, unspecified body region      Results     Labs today       Oncologic History:  Diffuse large B cell lymphoma (H)  Lilli Russo is known with known history of Non Hodgkin's lymphoma since 2013 when she felt feverish, had chills, poor taste, fatigue, cough, not too much sputum production, indigestion, early satiety, night sweats.  She was found to have pancytopenia with white count 2.4, hemoglobin 10.2, MCV 92, platelets 74, absolute neutrophil count 0.9, diff not significant without premature cells..   CT chest, abdomen and pelvis 09/16/2013:  Mild splenomegaly.  Spleen measures 13.2 cm.  Further extensive infectious disease work up was negative. 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 She was seen by Dr. Fuller and is offered RCHOP [id number]/26/2013 with IT prophylaxis 12 mg MTX  On 9/26 with negative LP and brain MRI. Her presenting LDH is high >900, she received 1 dose of Rasburicase on 9/28/2013. She had RCHOP from 10/2013 to 1/2014. Repeat BM 11/26/2013 - negative for lymphoma by morphology and flow, cyto is nl.  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       Interval History:  Patient is here today for follow-up. She has been feeling well without any recent complaints of weight loss or night sweats or fever or chills. She denies any shortness of breath or cough or wheezing. She denies any abdominal pain or nausea or vomiting     Review Of Systems:  Constitutional: Negative for fever, chills, and night sweats.  Skin: negative.  Eyes: negative.  Ears/Nose/Throat: negative.  Respiratory: No shortness of breath, dyspnea on exertion, cough, or hemoptysis.  Cardiovascular: negative.  Gastrointestinal:  "negative.  Genitourinary: negative.  Musculoskeletal: negative.  Neurologic: negative.  Psychiatric: negative.  Hematologic/Lymphatic/Immunologic: negative.  Endocrine: negative.    All other ROS negative unless mentioned in interval history.    Past medical, social, surgical, and family histories reviewed.    Allergies:  Allergies as of 11/07/2017 - Rick as Reviewed 11/07/2017   Allergen Reaction Noted     Ceftazidime Rash 09/24/2013       Current Medications:  Current Outpatient Prescriptions   Medication Sig Dispense Refill     levothyroxine (SYNTHROID/LEVOTHROID) 50 MCG tablet Take 1 tablet (50 mcg) by mouth daily 90 tablet 3     calcium carbonate (TUMS) 500 MG chewable tablet Take 2 chew tab by mouth daily as needed       Cholecalciferol (VITAMIN D-3 PO) Take 1,000 Units by mouth daily.        acetaminophen (TYLENOL) 325 MG tablet Take 2 tablets (650 mg) by mouth every 4 hours as needed for pain 100 tablet 0     MULTIVITAMIN TABS   OR 1 TABLET DAILY          Physical Exam:  /71 (BP Location: Right arm, Patient Position: Chair, Cuff Size: Adult Regular)  Pulse 68  Temp 96.6  F (35.9  C) (Temporal)  Resp 16  Ht 1.499 m (4' 11\")  Wt 39.7 kg (87 lb 9.6 oz)  SpO2 99%  BMI 17.69 kg/m2  Wt Readings from Last 12 Encounters:   11/07/17 39.7 kg (87 lb 9.6 oz)   06/09/17 42 kg (92 lb 9.6 oz)   05/09/17 42.2 kg (93 lb)   01/02/17 43 kg (94 lb 11.2 oz)   12/23/16 43.1 kg (95 lb)   11/08/16 42.2 kg (93 lb)   06/07/16 40.8 kg (90 lb)   05/12/16 41.3 kg (91 lb 1.6 oz)   05/05/16 41.3 kg (91 lb)   12/01/15 41.9 kg (92 lb 4.8 oz)   09/22/15 40.7 kg (89 lb 11.2 oz)   07/14/15 39.8 kg (87 lb 11.9 oz)     ECOG performance status: 0  GENERAL APPEARANCE: Healthy, alert and in no acute distress.  HEENT: Sclerae anicteric. PERRLA. Oropharynx without ulcers, lesions, or thrush.  NECK: Supple. No asymmetry or masses.  LYMPHATICS: No palpable cervical, supraclavicular, axillary, or inguinal lymphadenopathy.  RESP: Lungs clear " to auscultation bilaterally without rales, rhonchi or wheezes.  CARDIOVASCULAR: Regular rate and rhythm. Normal S1, S2; no S3 or S4. No murmur, gallop, or rub.  ABDOMEN: Soft, nontender. Bowel sounds normal. No palpable organomegaly or masses.  MUSCULOSKELETAL: Extremities without gross deformities noted. No edema of bilateral lower extremities.  SKIN: No suspicious lesions or rashes.  NEURO: Alert and oriented x 3. Cranial nerves II-XII grossly intact.  PSYCHIATRIC: Mentation and affect appear normal.    Laboratory/Imaging Studies:  No visits with results within 2 Week(s) from this visit.  Latest known visit with results is:    Office Visit on 06/09/2017   Component Date Value Ref Range Status     TSH 06/09/2017 4.57* 0.40 - 4.00 mU/L Final     T4 Free 06/09/2017 1.12  0.76 - 1.46 ng/dL Final        No results found for this or any previous visit (from the past 744 hour(s)).    Assessment and plan:    (C83.30) Diffuse large B-cell lymphoma, unspecified body region (H)  I reviewed with the patient today most recent laboratory tests. There is no evidence of disease recurrence. We will see the patient again in 6 months or sooner if there are new developments or concerns.    (E89.0) Postoperative hypothyroidism  (primary encounter diagnosis)  Patient will continue on Synthroid     The patient is ready to learn, no apparent learning barriers were identified.  Diagnosis and treatment plans were explained to the patient. The patient expressed understanding of the content. The patient asked appropriate questions. The patient questions were answered to her satisfaction.    Chart documentation with Dragon Voice recognition Software. Although reviewed after completion, some words and grammatical errors may remain.

## 2017-11-07 NOTE — NURSING NOTE
DISCHARGE PLAN:  Next appointments: See patient instruction section  Departure Mode: Ambulatory  Accompanied by: self  5 minutes for nursing discharge (face to face time)   Janel Castro MA    Writing nurse seen patient after Medical Oncology appointment to address questions/concerns/coordinate care.  Pt to RTC in 6 months with labs prior. Appointments scheduled per patient request. Questions and concerns addressed to patient's satisfaction. Contact information provided and patient is encouraged to call with any that arise in the interim of care.  See patient instructions and Oncologist's Progress note for further details.    Blanca Castro CMA  [facility]  [facility]  [phone]  11/7/2017, 1:21 PM

## 2017-12-01 ENCOUNTER — TELEPHONE (OUTPATIENT)
Dept: INTERNAL MEDICINE | Facility: CLINIC | Age: 77
End: 2017-12-01

## 2017-12-01 RX ORDER — ZOLEDRONIC ACID 5 MG/100ML
5 INJECTION, SOLUTION INTRAVENOUS ONCE
Status: CANCELLED
Start: 2018-01-02 | End: 2018-01-02

## 2017-12-01 NOTE — TELEPHONE ENCOUNTER
Yes she should do the Reclast infusion again this year and next year for osteoporosis.  Orders are placed and signed for infusion.

## 2017-12-01 NOTE — TELEPHONE ENCOUNTER
Reason for Call: Request for an order or referral:    Order or referral being requested: Patient had an infusion for osteopetrosis last January. Was told that she needed to do it again 1 year later. Wondering if that is still the plan. Please call her back and let her know what it is she needs to do.     Date needed: as soon as possible    Has the patient been seen by the PCP for this problem? YES    Additional comments:     Phone number Patient can be reached at:  Home number on file [phone] (home)    Best Time:  any    Can we leave a detailed message on this number?  YES    Call taken on 12/1/2017 at 11:17 AM by Ute Giraldo

## 2017-12-05 ENCOUNTER — HOSPITAL ENCOUNTER (OUTPATIENT)
Dept: ULTRASOUND IMAGING | Facility: CLINIC | Age: 77
Discharge: HOME | End: 2017-12-05
Attending: INTERNAL MEDICINE | Admitting: INTERNAL MEDICINE
Payer: MEDICARE

## 2017-12-05 ENCOUNTER — HOSPITAL ENCOUNTER (OUTPATIENT)
Dept: MAMMOGRAPHY | Facility: CLINIC | Age: 77
End: 2017-12-05
Attending: INTERNAL MEDICINE
Payer: MEDICARE

## 2017-12-05 DIAGNOSIS — R92.8 ABNORMAL MAMMOGRAM: ICD-10-CM

## 2017-12-05 PROCEDURE — 76642 ULTRASOUND BREAST LIMITED: CPT | Mod: LT

## 2017-12-05 PROCEDURE — G0279 TOMOSYNTHESIS, MAMMO: HCPCS

## 2018-01-10 ENCOUNTER — INFUSION THERAPY VISIT (OUTPATIENT)
Dept: INFUSION THERAPY | Facility: CLINIC | Age: 78
End: 2018-01-10
Attending: INTERNAL MEDICINE
Payer: MEDICARE

## 2018-01-10 VITALS
RESPIRATION RATE: 16 BRPM | DIASTOLIC BLOOD PRESSURE: 61 MMHG | OXYGEN SATURATION: 99 % | HEART RATE: 66 BPM | SYSTOLIC BLOOD PRESSURE: 124 MMHG | TEMPERATURE: 98 F

## 2018-01-10 DIAGNOSIS — M81.0 OSTEOPOROSIS, UNSPECIFIED OSTEOPOROSIS TYPE, UNSPECIFIED PATHOLOGICAL FRACTURE PRESENCE: ICD-10-CM

## 2018-01-10 DIAGNOSIS — Z94.81 STATUS POST BONE MARROW TRANSPLANT (H): Primary | ICD-10-CM

## 2018-01-10 DIAGNOSIS — C83.30 DIFFUSE LARGE B-CELL LYMPHOMA, UNSPECIFIED BODY REGION (H): ICD-10-CM

## 2018-01-10 LAB
CALCIUM SERPL-MCNC: 9.5 MG/DL (ref 8.5–10.1)
CREAT SERPL-MCNC: 0.87 MG/DL (ref 0.52–1.04)
GFR SERPL CREATININE-BSD FRML MDRD: 63 ML/MIN/1.7M2

## 2018-01-10 PROCEDURE — 96365 THER/PROPH/DIAG IV INF INIT: CPT

## 2018-01-10 PROCEDURE — 25000128 H RX IP 250 OP 636: Performed by: INTERNAL MEDICINE

## 2018-01-10 PROCEDURE — 82565 ASSAY OF CREATININE: CPT | Performed by: INTERNAL MEDICINE

## 2018-01-10 PROCEDURE — 82310 ASSAY OF CALCIUM: CPT | Performed by: INTERNAL MEDICINE

## 2018-01-10 PROCEDURE — 36415 COLL VENOUS BLD VENIPUNCTURE: CPT | Performed by: INTERNAL MEDICINE

## 2018-01-10 RX ORDER — ZOLEDRONIC ACID 5 MG/100ML
5 INJECTION, SOLUTION INTRAVENOUS ONCE
Status: COMPLETED | OUTPATIENT
Start: 2018-01-10 | End: 2018-01-10

## 2018-01-10 RX ORDER — ZOLEDRONIC ACID 5 MG/100ML
5 INJECTION, SOLUTION INTRAVENOUS ONCE
Status: CANCELLED
Start: 2018-01-10 | End: 2018-01-10

## 2018-01-10 RX ADMIN — ZOLEDRONIC ACID 5 MG: 5 INJECTION, SOLUTION INTRAVENOUS at 13:34

## 2018-01-10 RX ADMIN — SODIUM CHLORIDE 250 ML: 9 INJECTION, SOLUTION INTRAVENOUS at 13:37

## 2018-01-10 ASSESSMENT — PAIN SCALES - GENERAL: PAINLEVEL: NO PAIN (0)

## 2018-01-10 NOTE — PROGRESS NOTES
Pt here for Reclast infusion, tolerated well.  Pt discharged in stable condition.   Pt stated last year after her Reclast that she got terrible jaw pain.  Neither her Dentist or Dr Roman felt it was from the Reclast or TMJ,  they thought it was an infection.    Plan : will f/u with patient in a day or 2 and see how she is getting along.

## 2018-01-10 NOTE — MR AVS SNAPSHOT
After Visit Summary   1/10/2018    Lilli Russo    MRN: [id number]           Patient Information     Date Of Birth          1940        Visit Information        Provider Department      1/10/2018 1:00 PM NL INFUSION CHAIR 3 [facility]        Today's Diagnoses     Status post bone marrow transplant (H)    -  1    Diffuse large B-cell lymphoma, unspecified body region (H)        Osteoporosis, unspecified osteoporosis type, unspecified pathological fracture presence          Care Instructions    Pt to return in 1 year, she will call to make her appointment at that time.           Follow-ups after your visit        Follow-up notes from your care team     Return in about 1 year (around 1/10/2019).      Your next 10 appointments already scheduled     May 08, 2018  1:00 PM CDT   [facility]    [address] [place] [id number] [phone]              Who to contact     If you have questions or need follow up information about today's clinic visit or your schedule please contact [facility] directly at [phone].  Normal or non-critical lab and imaging results will be communicated to you by groSolarhart, letter or phone within 4 business days after the clinic has received the results. If you do not hear from us within 7 days, please contact the clinic through groSolarhart or phone. If you have a critical or abnormal lab result, we will notify you by phone as soon as possible.  Submit refill requests through Babelgum or call your pharmacy and they will forward the refill request to us. Please allow 3 business days for your refill to be completed.          Additional Information About Your Visit        [employer]olar[employer] Information     Babel[employer]um lets you send messages to your doctor, view your test results, renew your prescriptions, schedule appointments and more. To sign up,  "go to [email or web address] . Click on \"Log in\" on the left side of the screen, which will take you to the Welcome page. Then click on \"Sign up Now\" on the right side of the page.     You will be asked to enter the access code listed below, as well as some personal information. Please follow the directions to create your username and password.     Your access code is: [email or web address]  Expires: 2018  1:16 PM     Your access code will  in 90 days. If you need help or a new code, please call your [place] clinic or [phone].        Care EveryWhere ID     This is your Care EveryWhere ID. This could be used by other organizations to access your [place] medical records  [id number]        Your Vitals Were     Pulse Temperature Respirations Pulse Oximetry          66 98  F (36.7  C) (Temporal) 16 99%         Blood Pressure from Last 3 Encounters:   01/10/18 124/61   17 110/71   17 100/62    Weight from Last 3 Encounters:   17 39.7 kg (87 lb 9.6 oz)   17 42 kg (92 lb 9.6 oz)   17 42.2 kg (93 lb)              We Performed the Following     Calcium     Creatinine        Primary Care Provider Office Phone # Fax #    Brian Roman [phone]       [facility] [address] [place] [address]        Equal Access to Services     NGUYỄN ROBERTO : Hadii carla ku hadasho Soomaali, waaxda luqadaha, qaybta kaalmada adeegyada, tori estrella . So [facility] [phone].    ATENCIÓN: Si habla español, tiene a weller disposición servicios gratuitos de asistencia lingüística. Laron al [phone].    We comply with applicable federal civil rights laws and [place] laws. We do not discriminate on the basis of race, color, national origin, age, disability, sex, sexual orientation, or gender identity.            Thank you!     Thank you for choosing [facility]  for your care. Our goal is always to provide you with " excellent care. Hearing back from our patients is one way we can continue to improve our services. Please take a few minutes to complete the written survey that you may receive in the mail after your visit with us. Thank you!             Your Updated Medication List - Protect others around you: Learn how to safely use, store and throw away your medicines at www.disposemymeds.org.          This list is accurate as of: 1/10/18  5:03 PM.  Always use your most recent med list.                   Brand Name Dispense Instructions for use Diagnosis    acetaminophen 325 MG tablet    TYLENOL    100 tablet    Take 2 tablets (650 mg) by mouth every 4 hours as needed for pain        levothyroxine 50 MCG tablet    SYNTHROID/LEVOTHROID    90 tablet    Take 1 tablet (50 mcg) by mouth daily    Postoperative hypothyroidism       MULTIVITAMIN TABS   OR      1 TABLET DAILY        TUMS 500 MG chewable tablet   Generic drug:  calcium carbonate      Take 2 chew tab by mouth daily as needed        VITAMIN D-3 PO      Take 1,000 Units by mouth daily.

## 2018-05-15 ENCOUNTER — ONCOLOGY VISIT (OUTPATIENT)
Dept: ONCOLOGY | Facility: CLINIC | Age: 78
End: 2018-05-15
Payer: MEDICARE

## 2018-05-15 VITALS
HEIGHT: 59 IN | RESPIRATION RATE: 16 BRPM | BODY MASS INDEX: 17.78 KG/M2 | TEMPERATURE: 97 F | OXYGEN SATURATION: 97 % | WEIGHT: 88.2 LBS | HEART RATE: 63 BPM

## 2018-05-15 DIAGNOSIS — E89.0 POSTOPERATIVE HYPOTHYROIDISM: ICD-10-CM

## 2018-05-15 DIAGNOSIS — C83.30 DIFFUSE LARGE B-CELL LYMPHOMA, UNSPECIFIED BODY REGION (H): Primary | ICD-10-CM

## 2018-05-15 LAB
ALBUMIN SERPL-MCNC: 3.9 G/DL (ref 3.4–5)
ALP SERPL-CCNC: 57 U/L (ref 40–150)
ALT SERPL W P-5'-P-CCNC: 24 U/L (ref 0–50)
ANION GAP SERPL CALCULATED.3IONS-SCNC: 6 MMOL/L (ref 3–14)
AST SERPL W P-5'-P-CCNC: 26 U/L (ref 0–45)
BASOPHILS # BLD AUTO: 0 10E9/L (ref 0–0.2)
BASOPHILS NFR BLD AUTO: 0.2 %
BILIRUB SERPL-MCNC: 0.5 MG/DL (ref 0.2–1.3)
BUN SERPL-MCNC: 16 MG/DL (ref 7–30)
CALCIUM SERPL-MCNC: 9.4 MG/DL (ref 8.5–10.1)
CHLORIDE SERPL-SCNC: 102 MMOL/L (ref 94–109)
CO2 SERPL-SCNC: 33 MMOL/L (ref 20–32)
CREAT SERPL-MCNC: 0.82 MG/DL (ref 0.52–1.04)
DIFFERENTIAL METHOD BLD: NORMAL
EOSINOPHIL # BLD AUTO: 0.1 10E9/L (ref 0–0.7)
EOSINOPHIL NFR BLD AUTO: 1 %
ERYTHROCYTE [DISTWIDTH] IN BLOOD BY AUTOMATED COUNT: 13 % (ref 10–15)
GFR SERPL CREATININE-BSD FRML MDRD: 67 ML/MIN/1.7M2
GLUCOSE SERPL-MCNC: 100 MG/DL (ref 70–99)
HCT VFR BLD AUTO: 41.1 % (ref 35–47)
HGB BLD-MCNC: 13.7 G/DL (ref 11.7–15.7)
IMM GRANULOCYTES # BLD: 0 10E9/L (ref 0–0.4)
IMM GRANULOCYTES NFR BLD: 0.2 %
LDH SERPL L TO P-CCNC: 181 U/L (ref 81–234)
LYMPHOCYTES # BLD AUTO: 1.8 10E9/L (ref 0.8–5.3)
LYMPHOCYTES NFR BLD AUTO: 29.9 %
MCH RBC QN AUTO: 33 PG (ref 26.5–33)
MCHC RBC AUTO-ENTMCNC: 33.3 G/DL (ref 31.5–36.5)
MCV RBC AUTO: 99 FL (ref 78–100)
MONOCYTES # BLD AUTO: 0.6 10E9/L (ref 0–1.3)
MONOCYTES NFR BLD AUTO: 9.6 %
NEUTROPHILS # BLD AUTO: 3.6 10E9/L (ref 1.6–8.3)
NEUTROPHILS NFR BLD AUTO: 59.1 %
PLATELET # BLD AUTO: 164 10E9/L (ref 150–450)
POTASSIUM SERPL-SCNC: 3.9 MMOL/L (ref 3.4–5.3)
PROT SERPL-MCNC: 7 G/DL (ref 6.8–8.8)
RBC # BLD AUTO: 4.15 10E12/L (ref 3.8–5.2)
SODIUM SERPL-SCNC: 141 MMOL/L (ref 133–144)
WBC # BLD AUTO: 6 10E9/L (ref 4–11)

## 2018-05-15 PROCEDURE — 83615 LACTATE (LD) (LDH) ENZYME: CPT | Performed by: INTERNAL MEDICINE

## 2018-05-15 PROCEDURE — 36415 COLL VENOUS BLD VENIPUNCTURE: CPT | Performed by: INTERNAL MEDICINE

## 2018-05-15 PROCEDURE — 85025 COMPLETE CBC W/AUTO DIFF WBC: CPT | Performed by: INTERNAL MEDICINE

## 2018-05-15 PROCEDURE — 99214 OFFICE O/P EST MOD 30 MIN: CPT | Performed by: INTERNAL MEDICINE

## 2018-05-15 PROCEDURE — 80053 COMPREHEN METABOLIC PANEL: CPT | Performed by: INTERNAL MEDICINE

## 2018-05-15 ASSESSMENT — PAIN SCALES - GENERAL: PAINLEVEL: NO PAIN (0)

## 2018-05-15 NOTE — NURSING NOTE
"Oncology Rooming Note    May 15, 2018 12:49 PM   Lilli Russo is a 77 year old female who presents for:    Chief Complaint   Patient presents with     Oncology Clinic Visit     6 month follow up for Diffuse large B-cell lymphoma, unspecified body region      Initial Vitals: There were no vitals taken for this visit. Estimated body mass index is 17.69 kg/(m^2) as calculated from the following:    Height as of 11/7/17: 1.499 m (4' 11\").    Weight as of 11/7/17: 39.7 kg (87 lb 9.6 oz). There is no height or weight on file to calculate BSA.  Data Unavailable Comment: Data Unavailable   No LMP recorded. Patient is postmenopausal.  Allergies reviewed: Yes  Medications reviewed: Yes    Medications: Medication refills not needed today.  Pharmacy [facility] entered into iConText: [facility] [place] [facility] [address]    Nausea, Vomiting: No  Fever/Chills:  No  Mouth Sores: No  Diarrhea/Constipation: No  Urination: No Concerns  Skin/Excessive dryness: No Concerns  Cracking, Peeling: No  Unusual Bruising/Bleeding: No  Respiratory/SOB: No Concerns  Neuropathy/Numbness&Tingling-Hands/Feet: Yes (Please explain): in feet  Cognitive Disturbance-Memory: No  Sleep Concerns: No Concerns  Night Sweats:  No  Fatigue/Weakness: Yes (Please explain): Tired  Light Headed or Dizzy: No  Appetite:  Concerns (explain) - Pt states that it is soso  Able to drink 6 to 8 glasses of fluid: Concerns (explain) - don't drink that much  Sexuality: No Concerns      Clinical concerns: Tired, Neuropathy& numbness in feet Dr. Perez was notified.    10 minutes for nursing intake (face to face time)     Vane JARRETT CMA              "

## 2018-05-15 NOTE — MR AVS SNAPSHOT
After Visit Summary   5/15/2018    Lilli Russo    MRN: [id number]           Patient Information     Date Of Birth          1940        Visit Information        Provider Department      5/15/2018 1:30 PM Wayne Perez MD [facility]        Today's Diagnoses     Diffuse large B-cell lymphoma, unspecified body region (H)    -  1    Postoperative hypothyroidism          Care Instructions      Please follow up with Dr. Perez in 6 months.  Please come 15-30 minutes early for labs.    Follow Up Date/Time:     If you have any questions or concerns please feel free to call.    If you need to reschedule please call:  Clinic or Lab Appointment - [phone]  Infusion - [phone]  Imaging - [phone]    Parish Rodney RN, BSN, OCN  [facility]   Oncology/Hematology Care Coordinator RN  [facility]  [phone]              Follow-ups after your visit        Follow-up notes from your care team     Return in about 6 months (around 11/15/2018) for Blood work before next appointment.      Your next 10 appointments already scheduled     May 15, 2018  1:30 PM CDT   Return Visit with Wayne Perez MD   [facility]    [address] [place] [address]   [phone]            Jun 11, 2018  1:00 PM CDT   Office Visit with Brian Roman MD   [facility] ([address] [address]   [phone]           Bring a current list of meds and any records pertaining to this visit. For Physicals, please bring immunization records and any forms needing to be filled out. Please arrive 10 minutes early to complete paperwork.              Future tests that were ordered for you today     Open Future Orders        Priority Expected Expires Ordered    CBC with platelets differential Routine 11/15/2018 5/15/2019 5/15/2018    Comprehensive metabolic panel Routine  "11/15/2018 5/15/2019 5/15/2018    Lactate Dehydrogenase Routine 11/15/2018 5/15/2019 5/15/2018            Who to contact     If you have questions or need follow up information about today's clinic visit or your schedule please contact [facility] directly at [phone].  Normal or non-critical lab and imaging results will be communicated to you by MyChart, letter or phone within 4 business days after the clinic has received the results. If you do not hear from us within 7 days, please contact the clinic through mPorticohart or phone. If you have a critical or abnormal lab result, we will notify you by phone as soon as possible.  Submit refill requests through [employer] or call your pharmacy and they will forward the refill request to us. Please allow 3 business days for your refill to be completed.          Additional Information About Your Visit        MyChart Information     [employer] lets you send messages to your doctor, view your test results, renew your prescriptions, schedule appointments and more. To sign up, go to [email or web address] . Click on \"Log in\" on the left side of the screen, which will take you to the Welcome page. Then click on \"Sign up Now\" on the right side of the page.     You will be asked to enter the access code listed below, as well as some personal information. Please follow the directions to create your username and password.     Your access code is: [email or web address]  Expires: 2018  1:19 PM     Your access code will  in 90 days. If you need help or a new code, please call your [place] clinic or [phone].        Care EveryWhere ID     This is your Care EveryWhere ID. This could be used by other organizations to access your [place] medical records  [id number]        Your Vitals Were     Pulse Temperature Respirations Height Pulse Oximetry BMI (Body Mass Index)    63 97  F (36.1  C) (Temporal) 16 1.499 m (4' 11\") 97% 17.81 kg/m2       Blood Pressure from Last 3 " Encounters:   01/10/18 124/61   11/07/17 110/71   06/09/17 100/62    Weight from Last 3 Encounters:   05/15/18 40 kg (88 lb 3.2 oz)   11/07/17 39.7 kg (87 lb 9.6 oz)   06/09/17 42 kg (92 lb 9.6 oz)              We Performed the Following     CBC with platelets differential     Comprehensive metabolic panel     Lactate Dehydrogenase        Primary Care Provider Office Phone # Fax #    Brian Roman [phone]       [address] [facility] [address]        Equal Access to Services     [facility]: Hadii aad ku hadasho Soomaali, waaxda luqadaha, qaybta kaalmada ademistyyaniranjan, tori estrella . So [facility] [phone].    ATENCIÓN: Si habla español, tiene a weller disposición servicios gratuitos de asistencia lingüística. [facility] [phone].    We comply with applicable federal civil rights laws and [place] laws. We do not discriminate on the basis of race, color, national origin, age, disability, sex, sexual orientation, or gender identity.            Thank you!     Thank you for choosing [facility]  for your care. Our goal is always to provide you with excellent care. Hearing back from our patients is one way we can continue to improve our services. Please take a few minutes to complete the written survey that you may receive in the mail after your visit with us. Thank you!             Your Updated Medication List - Protect others around you: Learn how to safely use, store and throw away your medicines at www.disposemymeds.org.          This list is accurate as of 5/15/18  1:19 PM.  Always use your most recent med list.                   Brand Name Dispense Instructions for use Diagnosis    acetaminophen 325 MG tablet    TYLENOL    100 tablet    Take 2 tablets (650 mg) by mouth every 4 hours as needed for pain        levothyroxine 50 MCG tablet    SYNTHROID/LEVOTHROID    90 tablet    Take 1 tablet (50 mcg) by mouth daily    Postoperative hypothyroidism        MULTIVITAMIN TABS   OR      1 TABLET DAILY        TUMS 500 MG chewable tablet   Generic drug:  calcium carbonate      Take 2 chew tab by mouth daily as needed        VITAMIN D-3 PO      Take 1,000 Units by mouth daily.

## 2018-05-15 NOTE — ASSESSMENT & PLAN NOTE
Lilli Russo is known with known history of Non Hodgkin's lymphoma since 2013 when she felt feverish, had chills, poor taste, fatigue, cough, not too much sputum production, indigestion, early satiety, night sweats.  She was found to have pancytopenia with white count 2.4, hemoglobin 10.2, MCV 92, platelets 74, absolute neutrophil count 0.9, diff not significant without premature cells..   CT chest, abdomen and pelvis 09/16/2013:  Mild splenomegaly.  Spleen measures 13.2 cm.  Further extensive infectious disease work up was negative. 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 She was seen by Dr. Fuller and is offered RCHOP [id number]/26/2013 with IT prophylaxis 12 mg MTX  On 9/26 with negative LP and brain MRI. Her presenting LDH is high >900, she received 1 dose of Rasburicase on 9/28/2013. She had RCHOP from 10/2013 to 1/2014. Repeat BM 11/26/2013 - negative for lymphoma by morphology and flow, cyto is nl.  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

## 2018-05-15 NOTE — NURSING NOTE
DISCHARGE PLAN:  Next appointments: See patient instruction section  Departure Mode: Ambulatory  Accompanied by: self  5 minutes for nursing discharge (face to face time)     Lilli Russo is here today for Oncology follow up.  Writing nurse seen patient after Medical Oncology appointment to address questions/concerns/coordinate care. Patient to follow up in 6 months with labs prior. Patient ambulated by nurse to  to schedule follow up and/or lab appointments.  Imaging scheduled if ordered. See patient instructions and Oncologist's Progress note for further details. Questions and concerns addressed to patient's satisfaction. Patient verbalized and demonstrated understanding of plan.  Contact information provided and patient is encouraged to call with any that arise in the interim of care.    Parish Rodney, RN, BSN, OCN   Oncology Care Coordinator RN  [facility]  [phone]  5/15/2018, 2:03 PM

## 2018-05-15 NOTE — PATIENT INSTRUCTIONS
Please follow up with Dr. Perez in 6 months.  Please come 15-30 minutes early for labs.    Follow Up Date/Time:     If you have any questions or concerns please feel free to call.    If you need to reschedule please call:  Clinic or Lab Appointment - [phone]  Infusion - [phone]  Imaging - [phone]    Parish Rodney RN, BSN, OCN  [facility]   Oncology/Hematology Care Coordinator RN  [facility]  [phone]

## 2018-05-15 NOTE — LETTER
5/15/2018         RE: Lilli Russo  [address]  [place] [place] [facility] [address]        Dear Colleague,    Thank you for referring your patient, Lilli Russo, to the [facility]. Please see a copy of my visit note below.    Hematology/ Oncology Follow-up Visit:  May 15, 2018    Reason for Visit:   Chief Complaint   Patient presents with     Oncology Clinic Visit     6 month follow up for Diffuse large B-cell lymphoma, unspecified body region        Oncologic History:  Diffuse large B cell lymphoma (H)  Lilli Russo is known with known history of Non Hodgkin's lymphoma since 2013 when she felt feverish, had chills, poor taste, fatigue, cough, not too much sputum production, indigestion, early satiety, night sweats.  She was found to have pancytopenia with white count 2.4, hemoglobin 10.2, MCV 92, platelets 74, absolute neutrophil count 0.9, diff not significant without premature cells..   CT chest, abdomen and pelvis 09/16/2013:  Mild splenomegaly.  Spleen measures 13.2 cm.  Further extensive infectious disease work up was negative. 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 She was seen by Dr. Fuller and is offered RCHOP [id number]/26/2013 with IT prophylaxis 12 mg MTX  On 9/26 with negative LP and brain MRI. Her presenting LDH is high >900, she received 1 dose of Rasburicase on 9/28/2013. She had RCHOP from 10/2013 to 1/2014. Repeat BM 11/26/2013 - negative for lymphoma by morphology and flow, cyto is nl.  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       Interval History:  Patient returning today for follow-up visit.  She has been feeling well without any recent complaints of weight loss night sweats fever or chills.  Denies any nausea vomiting or diarrhea.  Denies any shortness of breath or cough or wheezing.  Denies any bone aches or pains.    Review Of Systems:  Constitutional: Negative for fever, chills,  "and night sweats.  Skin: negative.  Eyes: negative.  Ears/Nose/Throat: negative.  Respiratory: No shortness of breath, dyspnea on exertion, cough, or hemoptysis.  Cardiovascular: negative.  Gastrointestinal: negative.  Genitourinary: negative.  Musculoskeletal: negative.  Neurologic: negative.  Psychiatric: negative.  Hematologic/Lymphatic/Immunologic: negative.  Endocrine: negative.    All other ROS negative unless mentioned in interval history.    Past medical, social, surgical, and family histories reviewed.    Allergies:  Allergies as of 05/15/2018 - Rick as Reviewed 05/15/2018   Allergen Reaction Noted     Ceftazidime Rash 09/24/2013       Current Medications:  Current Outpatient Prescriptions   Medication Sig Dispense Refill     acetaminophen (TYLENOL) 325 MG tablet Take 2 tablets (650 mg) by mouth every 4 hours as needed for pain 100 tablet 0     calcium carbonate (TUMS) 500 MG chewable tablet Take 2 chew tab by mouth daily as needed       Cholecalciferol (VITAMIN D-3 PO) Take 1,000 Units by mouth daily.        levothyroxine (SYNTHROID/LEVOTHROID) 50 MCG tablet Take 1 tablet (50 mcg) by mouth daily 90 tablet 3     MULTIVITAMIN TABS   OR 1 TABLET DAILY          Physical Exam:  Pulse 63  Temp 97  F (36.1  C) (Temporal)  Resp 16  Ht 1.499 m (4' 11\")  Wt 40 kg (88 lb 3.2 oz)  SpO2 97%  BMI 17.81 kg/m2  Wt Readings from Last 12 Encounters:   05/15/18 40 kg (88 lb 3.2 oz)   11/07/17 39.7 kg (87 lb 9.6 oz)   06/09/17 42 kg (92 lb 9.6 oz)   05/09/17 42.2 kg (93 lb)   01/02/17 43 kg (94 lb 11.2 oz)   12/23/16 43.1 kg (95 lb)   11/08/16 42.2 kg (93 lb)   06/07/16 40.8 kg (90 lb)   05/12/16 41.3 kg (91 lb 1.6 oz)   05/05/16 41.3 kg (91 lb)   12/01/15 41.9 kg (92 lb 4.8 oz)   09/22/15 40.7 kg (89 lb 11.2 oz)     ECOG performance status: 0  GENERAL APPEARANCE: Healthy, alert and in no acute distress.  HEENT: Sclerae anicteric. PERRLA. Oropharynx without ulcers, lesions, or thrush.  NECK: Supple. No asymmetry or " masses.  LYMPHATICS: No palpable cervical, supraclavicular, axillary, or inguinal lymphadenopathy.  RESP: Lungs clear to auscultation bilaterally without rales, rhonchi or wheezes.  CARDIOVASCULAR: Regular rate and rhythm. Normal S1, S2; no S3 or S4. No murmur, gallop, or rub.  ABDOMEN: Soft, nontender. Bowel sounds normal. No palpable organomegaly or masses.  MUSCULOSKELETAL: Extremities without gross deformities noted. No edema of bilateral lower extremities.  SKIN: No suspicious lesions or rashes.  NEURO: Alert and oriented x 3. Cranial nerves II-XII grossly intact.  PSYCHIATRIC: Mentation and affect appear normal.    Laboratory/Imaging Studies:  No visits with results within 2 Week(s) from this visit.  Latest known visit with results is:    Infusion Therapy Visit on 01/10/2018   Component Date Value Ref Range Status     Creatinine 01/10/2018 0.87  0.52 - 1.04 mg/dL Final     GFR Estimate 01/10/2018 63  >60 mL/min/1.7m2 Final    Non  GFR Calc     GFR Estimate If Black 01/10/2018 76  >60 mL/min/1.7m2 Final    African American GFR Calc     Calcium 01/10/2018 9.5  8.5 - 10.1 mg/dL Final        No results found for this or any previous visit (from the past 744 hour(s)).    Assessment and plan:  (C83.30) Diffuse large B-cell lymphoma, unspecified body region (H)  (primary encounter diagnosis)  I reviewed with patient today most recent laboratory test.  No clinical evidence of lymphoma recurrence.  I will see the patient again in 6 months or sooner if there are new developments or concerns.    (E89.0) Postoperative hypothyroidism  Patient currently on Synthroid 50 mcg daily.    The patient is ready to learn, no apparent learning barriers were identified.  Diagnosis and treatment plans were explained to the patient. The patient expressed understanding of the content. The patient asked appropriate questions. The patient questions were answered to her satisfaction.    Chart documentation with Dragon Voice  recognition Software. Although reviewed after completion, some words and grammatical errors may remain.    Again, thank you for allowing me to participate in the care of your patient.        Sincerely,        Wayne Perez MD

## 2018-05-15 NOTE — PROGRESS NOTES
Hematology/ Oncology Follow-up Visit:  May 15, 2018    Reason for Visit:   Chief Complaint   Patient presents with     Oncology Clinic Visit     6 month follow up for Diffuse large B-cell lymphoma, unspecified body region        Oncologic History:  Diffuse large B cell lymphoma (H)  Lilli Russo is known with known history of Non Hodgkin's lymphoma since 2013 when she felt feverish, had chills, poor taste, fatigue, cough, not too much sputum production, indigestion, early satiety, night sweats.  She was found to have pancytopenia with white count 2.4, hemoglobin 10.2, MCV 92, platelets 74, absolute neutrophil count 0.9, diff not significant without premature cells..   CT chest, abdomen and pelvis 09/16/2013:  Mild splenomegaly.  Spleen measures 13.2 cm.  Further extensive infectious disease work up was negative. 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 She was seen by Dr. Fuller and is offered [facility] [id number]/26/2013 with IT prophylaxis 12 mg MTX  On 9/26 with negative LP and brain MRI. Her presenting LDH is high >900, she received 1 dose of Rasburicase on 9/28/2013. She had [facility]OP from 10/2013 to 1/2014. Repeat BM 11/26/2013 - negative for lymphoma by morphology and flow, cyto is nl.  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       Interval History:  Patient returning today for follow-up visit.  She has been feeling well without any recent complaints of weight loss night sweats fever or chills.  Denies any nausea vomiting or diarrhea.  Denies any shortness of breath or cough or wheezing.  Denies any bone aches or pains.    Review Of Systems:  Constitutional: Negative for fever, chills, and night sweats.  Skin: negative.  Eyes: negative.  Ears/Nose/Throat: negative.  Respiratory: No shortness of breath, dyspnea on exertion, cough, or hemoptysis.  Cardiovascular: negative.  Gastrointestinal: negative.  Genitourinary:  "negative.  Musculoskeletal: negative.  Neurologic: negative.  Psychiatric: negative.  Hematologic/Lymphatic/Immunologic: negative.  Endocrine: negative.    All other ROS negative unless mentioned in interval history.    Past medical, social, surgical, and family histories reviewed.    Allergies:  Allergies as of 05/15/2018 - Rick as Reviewed 05/15/2018   Allergen Reaction Noted     Ceftazidime Rash 09/24/2013       Current Medications:  Current Outpatient Prescriptions   Medication Sig Dispense Refill     acetaminophen (TYLENOL) 325 MG tablet Take 2 tablets (650 mg) by mouth every 4 hours as needed for pain 100 tablet 0     calcium carbonate (TUMS) 500 MG chewable tablet Take 2 chew tab by mouth daily as needed       Cholecalciferol (VITAMIN D-3 PO) Take 1,000 Units by mouth daily.        levothyroxine (SYNTHROID/LEVOTHROID) 50 MCG tablet Take 1 tablet (50 mcg) by mouth daily 90 tablet 3     MULTIVITAMIN TABS   OR 1 TABLET DAILY          Physical Exam:  Pulse 63  Temp 97  F (36.1  C) (Temporal)  Resp 16  Ht 1.499 m (4' 11\")  Wt 40 kg (88 lb 3.2 oz)  SpO2 97%  BMI 17.81 kg/m2  Wt Readings from Last 12 Encounters:   05/15/18 40 kg (88 lb 3.2 oz)   11/07/17 39.7 kg (87 lb 9.6 oz)   06/09/17 42 kg (92 lb 9.6 oz)   05/09/17 42.2 kg (93 lb)   01/02/17 43 kg (94 lb 11.2 oz)   12/23/16 43.1 kg (95 lb)   11/08/16 42.2 kg (93 lb)   06/07/16 40.8 kg (90 lb)   05/12/16 41.3 kg (91 lb 1.6 oz)   05/05/16 41.3 kg (91 lb)   12/01/15 41.9 kg (92 lb 4.8 oz)   09/22/15 40.7 kg (89 lb 11.2 oz)     ECOG performance status: 0  GENERAL APPEARANCE: Healthy, alert and in no acute distress.  HEENT: Sclerae anicteric. PERRLA. Oropharynx without ulcers, lesions, or thrush.  NECK: Supple. No asymmetry or masses.  LYMPHATICS: No palpable cervical, supraclavicular, axillary, or inguinal lymphadenopathy.  RESP: Lungs clear to auscultation bilaterally without rales, rhonchi or wheezes.  CARDIOVASCULAR: Regular rate and rhythm. Normal S1, S2; " no S3 or S4. No murmur, gallop, or rub.  ABDOMEN: Soft, nontender. Bowel sounds normal. No palpable organomegaly or masses.  MUSCULOSKELETAL: Extremities without gross deformities noted. No edema of bilateral lower extremities.  SKIN: No suspicious lesions or rashes.  NEURO: Alert and oriented x 3. Cranial nerves II-XII grossly intact.  PSYCHIATRIC: Mentation and affect appear normal.    Laboratory/Imaging Studies:  No visits with results within 2 Week(s) from this visit.  Latest known visit with results is:    Infusion Therapy Visit on 01/10/2018   Component Date Value Ref Range Status     Creatinine 01/10/2018 0.87  0.52 - 1.04 mg/dL Final     GFR Estimate 01/10/2018 63  >60 mL/min/1.7m2 Final    Non  GFR Calc     GFR Estimate If Black 01/10/2018 76  >60 mL/min/1.7m2 Final    African American GFR Calc     Calcium 01/10/2018 9.5  8.5 - 10.1 mg/dL Final        No results found for this or any previous visit (from the past 744 hour(s)).    Assessment and plan:  (C83.30) Diffuse large B-cell lymphoma, unspecified body region (H)  (primary encounter diagnosis)  I reviewed with patient today most recent laboratory test.  No clinical evidence of lymphoma recurrence.  I will see the patient again in 6 months or sooner if there are new developments or concerns.    (E89.0) Postoperative hypothyroidism  Patient currently on Synthroid 50 mcg daily.    The patient is ready to learn, no apparent learning barriers were identified.  Diagnosis and treatment plans were explained to the patient. The patient expressed understanding of the content. The patient asked appropriate questions. The patient questions were answered to her satisfaction.    Chart documentation with Dragon Voice recognition Software. Although reviewed after completion, some words and grammatical errors may remain.

## 2018-06-11 ENCOUNTER — OFFICE VISIT (OUTPATIENT)
Dept: INTERNAL MEDICINE | Facility: CLINIC | Age: 78
End: 2018-06-11
Payer: MEDICARE

## 2018-06-11 VITALS
DIASTOLIC BLOOD PRESSURE: 62 MMHG | TEMPERATURE: 97.4 F | SYSTOLIC BLOOD PRESSURE: 112 MMHG | RESPIRATION RATE: 16 BRPM | HEART RATE: 74 BPM | BODY MASS INDEX: 17.77 KG/M2 | OXYGEN SATURATION: 97 % | WEIGHT: 88 LBS

## 2018-06-11 DIAGNOSIS — E89.0 POSTOPERATIVE HYPOTHYROIDISM: ICD-10-CM

## 2018-06-11 DIAGNOSIS — Z94.81 STATUS POST BONE MARROW TRANSPLANT (H): ICD-10-CM

## 2018-06-11 DIAGNOSIS — M81.0 OSTEOPOROSIS, UNSPECIFIED OSTEOPOROSIS TYPE, UNSPECIFIED PATHOLOGICAL FRACTURE PRESENCE: Primary | ICD-10-CM

## 2018-06-11 DIAGNOSIS — C83.30 DIFFUSE LARGE B-CELL LYMPHOMA, UNSPECIFIED BODY REGION (H): ICD-10-CM

## 2018-06-11 LAB
T4 FREE SERPL-MCNC: 1.1 NG/DL (ref 0.76–1.46)
TSH SERPL DL<=0.005 MIU/L-ACNC: 2.52 MU/L (ref 0.4–4)

## 2018-06-11 PROCEDURE — 36415 COLL VENOUS BLD VENIPUNCTURE: CPT | Performed by: INTERNAL MEDICINE

## 2018-06-11 PROCEDURE — 84443 ASSAY THYROID STIM HORMONE: CPT | Performed by: INTERNAL MEDICINE

## 2018-06-11 PROCEDURE — 84439 ASSAY OF FREE THYROXINE: CPT | Performed by: INTERNAL MEDICINE

## 2018-06-11 PROCEDURE — 99214 OFFICE O/P EST MOD 30 MIN: CPT | Performed by: INTERNAL MEDICINE

## 2018-06-11 RX ORDER — LEVOTHYROXINE SODIUM 50 UG/1
50 TABLET ORAL DAILY
Qty: 90 TABLET | Refills: 3 | Status: SHIPPED | OUTPATIENT
Start: 2018-06-11 | End: 2019-06-12

## 2018-06-11 ASSESSMENT — PAIN SCALES - GENERAL: PAINLEVEL: NO PAIN (0)

## 2018-06-11 NOTE — MR AVS SNAPSHOT
"              After Visit Summary   2018    Lilli Russo    MRN: [id number]           Patient Information     Date Of Birth          1940        Visit Information        Provider Department      2018 1:00 PM Brian Roman MD [facility]         Follow-ups after your visit        Your next 10 appointments already scheduled     2018  1:00 PM CST   Return Visit with Wayne Perez MD   [facility] ([facility]    [address] [place] [address] [phone]              Who to contact     If you have questions or need follow up information about today's clinic visit or your schedule please contact [facility] directly at [phone].  Normal or non-critical lab and imaging results will be communicated to you by MyChart, letter or phone within 4 business days after the clinic has received the results. If you do not hear from us within 7 days, please contact the clinic through MyChart or phone. If you have a critical or abnormal lab result, we will notify you by phone as soon as possible.  Submit refill requests through AlchemyAPI or call your pharmacy and they will forward the refill request to us. Please allow 3 business days for your refill to be completed.          Additional Information About Your Visit        MyCharWellGen Information     AlchemyAPI lets you send messages to your doctor, view your test results, renew your prescriptions, schedule appointments and more. To sign up, go to [email or web address] . Click on \"Log in\" on the left side of the screen, which will take you to the Welcome page. Then click on \"Sign up Now\" on the right side of the page.     You will be asked to enter the access code listed below, as well as some personal information. Please follow the directions to create your username and password.     Your access code is: [email or web address]  Expires: 2018  1:19 PM     Your access code will  in " 90 days. If you need help or a new code, please call your [facility] clinic or [phone].        Care EveryWhere ID     This is your Care EveryWhere ID. This could be used by other organizations to access your [facility] medical records  [id number]        Your Vitals Were     Pulse Temperature Respirations Pulse Oximetry BMI (Body Mass Index)       74 97.4  F (36.3  C) (Temporal) 16 97% 17.77 kg/m2        Blood Pressure from Last 3 Encounters:   06/11/18 112/62   01/10/18 124/61   11/07/17 110/71    Weight from Last 3 Encounters:   06/11/18 88 lb (39.9 kg)   05/15/18 88 lb 3.2 oz (40 kg)   11/07/17 87 lb 9.6 oz (39.7 kg)              Today, you had the following     No orders found for display       Primary Care Provider Office Phone # Fax #    Brian Roman [phone]        [facility] [address]        Equal Access to Services     NGUYỄN ROBERTO : Hadii aad ku hadasho Soomaali, waaxda luqadaha, qaybta kaalmada adeegyada, waxay idiin hayrolandon diana estrella . So [facility] [phone].    ATENCIÓN: Si habla español, tiene a weller disposición servicios gratuitos de asistencia lingüística. Llame al [phone].    We comply with applicable federal civil rights laws and [place] laws. We do not discriminate on the basis of race, color, national origin, age, disability, sex, sexual orientation, or gender identity.            Thank you!     Thank you for choosing [facility]  for your care. Our goal is always to provide you with excellent care. Hearing back from our patients is one way we can continue to improve our services. Please take a few minutes to complete the written survey that you may receive in the mail after your visit with us. Thank you!             Your Updated Medication List - Protect others around you: Learn how to safely use, store and throw away your medicines at www.disposemymeds.org.          This list is accurate as of 6/11/18  1:03 PM.  Always use  your most recent med list.                   Brand Name Dispense Instructions for use Diagnosis    acetaminophen 325 MG tablet    TYLENOL    100 tablet    Take 2 tablets (650 mg) by mouth every 4 hours as needed for pain        levothyroxine 50 MCG tablet    SYNTHROID/LEVOTHROID    90 tablet    Take 1 tablet (50 mcg) by mouth daily    Postoperative hypothyroidism       MULTIVITAMIN TABS   OR      1 TABLET DAILY        TUMS 500 MG chewable tablet   Generic drug:  calcium carbonate      Take 2 chew tab by mouth daily as needed        VITAMIN D-3 PO      Take 1,000 Units by mouth daily.

## 2018-06-11 NOTE — PROGRESS NOTES
SUBJECTIVE:   Lilli Russo is a 77 year old female who presents to clinic today for the following health issues:      Chief Complaint   Patient presents with     Recheck Medication     thyroid     Osteoporosis     f/u     She is quite active, does weight exercises. She is walking a lot inside and outside.  Feeling pretty good, saw oncology and lymphoma is ok, recheck in November.      She received an infusion of reclast last January, gets some pains higher in her temple. Jaw was ok.  Doesn't last very long.    Eating ok, weight is always low.  Higher then when she was sick.      Thyroid seems fine to her, taking her medications 50 mcg a day.  Taking on empty stomach each morning.    Past Medical History:   Diagnosis Date     Cancer (H) 5/2014    lymphoma, large b cell, stem cell transplant     Macular degeneration (senile) of retina, unspecified      Osteoporosis, unspecified      Pure hypercholesterolemia      Unspecified closed fracture of pelvis 01/12/09     Unspecified hypothyroidism      Current Outpatient Prescriptions   Medication     acetaminophen (TYLENOL) 325 MG tablet     calcium carbonate (TUMS) 500 MG chewable tablet     Cholecalciferol (VITAMIN D-3 PO)     levothyroxine (SYNTHROID/LEVOTHROID) 50 MCG tablet     MULTIVITAMIN TABS   OR     [DISCONTINUED] levothyroxine (SYNTHROID/LEVOTHROID) 50 MCG tablet     No current facility-administered medications for this visit.      Social History   Substance Use Topics     Smoking status: Never Smoker     Smokeless tobacco: Never Used     Alcohol use No     Physical Exam  /62  Pulse 74  Temp 97.4  F (36.3  C) (Temporal)  Resp 16  Wt 88 lb (39.9 kg)  SpO2 97%  BMI 17.77 kg/m2  General Appearance-healthy, alert, no distress  ENT-Thyroid is not palpable, scar present, no nodules  Cardiac-regular rate and rhythm  with normal S1, S2 ; no murmur, rub or gallops  Lungs-clear to auscultation    ASSESSMENT:  Patient that is doing well.    Lymphoma is treated  and status post bone marrow transplant, will continue to follow with oncology.    Osteoporosis, severe but she is active, weight bearing exercise. She is on reclast infusions yearly as she didn't like fosamax or actonel.      Hypothyroid from surgery, doing ok, will check tsh and free t4, balance between treatment and her energy and low weight.    Electronically signed by Brian Roman MD

## 2018-11-13 ENCOUNTER — ONCOLOGY VISIT (OUTPATIENT)
Dept: ONCOLOGY | Facility: CLINIC | Age: 78
End: 2018-11-13
Payer: MEDICARE

## 2018-11-13 VITALS
SYSTOLIC BLOOD PRESSURE: 112 MMHG | WEIGHT: 87.3 LBS | DIASTOLIC BLOOD PRESSURE: 64 MMHG | HEIGHT: 59 IN | OXYGEN SATURATION: 98 % | TEMPERATURE: 97.2 F | RESPIRATION RATE: 16 BRPM | HEART RATE: 66 BPM | BODY MASS INDEX: 17.6 KG/M2

## 2018-11-13 DIAGNOSIS — E89.0 POSTOPERATIVE HYPOTHYROIDISM: ICD-10-CM

## 2018-11-13 DIAGNOSIS — C83.30 DIFFUSE LARGE B-CELL LYMPHOMA, UNSPECIFIED BODY REGION (H): Primary | ICD-10-CM

## 2018-11-13 LAB
ALBUMIN SERPL-MCNC: 3.6 G/DL (ref 3.4–5)
ALP SERPL-CCNC: 55 U/L (ref 40–150)
ALT SERPL W P-5'-P-CCNC: 23 U/L (ref 0–50)
ANION GAP SERPL CALCULATED.3IONS-SCNC: 4 MMOL/L (ref 3–14)
AST SERPL W P-5'-P-CCNC: 21 U/L (ref 0–45)
BASOPHILS # BLD AUTO: 0 10E9/L (ref 0–0.2)
BASOPHILS NFR BLD AUTO: 0.4 %
BILIRUB SERPL-MCNC: 0.5 MG/DL (ref 0.2–1.3)
BUN SERPL-MCNC: 11 MG/DL (ref 7–30)
CALCIUM SERPL-MCNC: 9.3 MG/DL (ref 8.5–10.1)
CHLORIDE SERPL-SCNC: 102 MMOL/L (ref 94–109)
CO2 SERPL-SCNC: 35 MMOL/L (ref 20–32)
CREAT SERPL-MCNC: 0.8 MG/DL (ref 0.52–1.04)
DIFFERENTIAL METHOD BLD: ABNORMAL
EOSINOPHIL NFR BLD AUTO: 1.2 %
ERYTHROCYTE [DISTWIDTH] IN BLOOD BY AUTOMATED COUNT: 12.4 % (ref 10–15)
GFR SERPL CREATININE-BSD FRML MDRD: 69 ML/MIN/1.7M2
GLUCOSE SERPL-MCNC: 98 MG/DL (ref 70–99)
HCT VFR BLD AUTO: 38.4 % (ref 35–47)
HGB BLD-MCNC: 12.9 G/DL (ref 11.7–15.7)
IMM GRANULOCYTES # BLD: 0 10E9/L (ref 0–0.4)
IMM GRANULOCYTES NFR BLD: 0.2 %
LDH SERPL L TO P-CCNC: 187 U/L (ref 81–234)
LYMPHOCYTES # BLD AUTO: 1.6 10E9/L (ref 0.8–5.3)
LYMPHOCYTES NFR BLD AUTO: 32.3 %
MCH RBC QN AUTO: 33.5 PG (ref 26.5–33)
MCHC RBC AUTO-ENTMCNC: 33.6 G/DL (ref 31.5–36.5)
MCV RBC AUTO: 100 FL (ref 78–100)
MONOCYTES # BLD AUTO: 0.5 10E9/L (ref 0–1.3)
MONOCYTES NFR BLD AUTO: 9.1 %
NEUTROPHILS # BLD AUTO: 2.9 10E9/L (ref 1.6–8.3)
NEUTROPHILS NFR BLD AUTO: 56.8 %
NRBC # BLD AUTO: 0 10*3/UL
NRBC BLD AUTO-RTO: 0 /100
PLATELET # BLD AUTO: 141 10E9/L (ref 150–450)
POTASSIUM SERPL-SCNC: 4 MMOL/L (ref 3.4–5.3)
PROT SERPL-MCNC: 6.8 G/DL (ref 6.8–8.8)
RBC # BLD AUTO: 3.85 10E12/L (ref 3.8–5.2)
SODIUM SERPL-SCNC: 141 MMOL/L (ref 133–144)
WBC # BLD AUTO: 5.1 10E9/L (ref 4–11)

## 2018-11-13 PROCEDURE — 36415 COLL VENOUS BLD VENIPUNCTURE: CPT | Performed by: INTERNAL MEDICINE

## 2018-11-13 PROCEDURE — 83615 LACTATE (LD) (LDH) ENZYME: CPT | Performed by: INTERNAL MEDICINE

## 2018-11-13 PROCEDURE — 80053 COMPREHEN METABOLIC PANEL: CPT | Performed by: INTERNAL MEDICINE

## 2018-11-13 PROCEDURE — 85025 COMPLETE CBC W/AUTO DIFF WBC: CPT | Performed by: INTERNAL MEDICINE

## 2018-11-13 PROCEDURE — 99214 OFFICE O/P EST MOD 30 MIN: CPT | Performed by: INTERNAL MEDICINE

## 2018-11-13 RX ORDER — DIMENHYDRINATE 50 MG
1 TABLET ORAL 2 TIMES DAILY
COMMUNITY
End: 2021-06-21

## 2018-11-13 ASSESSMENT — PAIN SCALES - GENERAL: PAINLEVEL: NO PAIN (0)

## 2018-11-13 NOTE — MR AVS SNAPSHOT
After Visit Summary   11/13/2018    Lilli Russo    MRN: [id number]           Patient Information     Date Of Birth          1940        Visit Information        Provider Department      11/13/2018 1:00 PM Wayne Perez MD [facility]        Today's Diagnoses     Diffuse large B-cell lymphoma, unspecified body region (H)    -  1    Postoperative hypothyroidism          Care Instructions      Please follow up with Dr. Perez in 1 year with labs.  Please come 20-30 minutes early for labs.    Follow Up Date/Time:     If you have any questions or concerns please feel free to call.    If you need to reschedule please call:  Clinic or Lab Appointment - [phone]  Infusion - [phone]  Imaging - [phone]    Parish Rodney RN, BSN, OCN  [facility]   Oncology/Hematology Care Coordinator RN  [facility]  [phone]              Follow-ups after your visit        Follow-up notes from your care team     Return in about 1 year (around 11/13/2019) for Labs day of visit, Routine Visit.      Future tests that were ordered for you today     Open Future Orders        Priority Expected Expires Ordered    CBC with platelets differential Routine 11/13/2018 11/13/2019 11/13/2018    Comprehensive metabolic panel Routine 11/13/2018 11/13/2019 11/13/2018    Lactate Dehydrogenase Routine 11/13/2018 11/13/2019 11/13/2018            Who to contact     If you have questions or need follow up information about today's clinic visit or your schedule please contact [facility] directly at [phone].  Normal or non-critical lab and imaging results will be communicated to you by MyChart, letter or phone within 4 business days after the clinic has received the results. If you do not hear from us within 7 days, please contact the clinic through MyChart or phone. If you have a critical or abnormal lab result, we will notify you by phone as soon as possible.  Submit  "refill requests through ThumbAd or call your pharmacy and they will forward the refill request to us. Please allow 3 business days for your refill to be completed.          Additional Information About Your Visit        EquityZenhart Information     ThumbAd lets you send messages to your doctor, view your test results, renew your prescriptions, schedule appointments and more. To sign up, go to [email or web address] . Click on \"Log in\" on the left side of the screen, which will take you to the Welcome page. Then click on \"Sign up Now\" on the right side of the page.     You will be asked to enter the access code listed below, as well as some personal information. Please follow the directions to create your username and password.     Your access code is: [email or web address]  Expires: 2019  1:19 PM     Your access code will  in 90 days. If you need help or a new code, please call your [facility] clinic or [phone].        Care EveryWhere ID     This is your Care EveryWhere ID. This could be used by other organizations to access your [facility] medical records  [id number]        Your Vitals Were     Pulse Temperature Respirations Height Pulse Oximetry BMI (Body Mass Index)    66 97.2  F (36.2  C) (Temporal) 16 1.499 m (4' 11\") 98% 17.63 kg/m2       Blood Pressure from Last 3 Encounters:   18 112/64   18 112/62   01/10/18 124/61    Weight from Last 3 Encounters:   18 39.6 kg (87 lb 4.8 oz)   18 39.9 kg (88 lb)   05/15/18 40 kg (88 lb 3.2 oz)              We Performed the Following     CBC with platelets differential     Comprehensive metabolic panel     Lactate Dehydrogenase        Primary Care Provider Office Phone # Fax #    Brian Roman [phone]        [facility] [address]        Equal Access to Services     NGUYỄN ROBERTO : Jennifer Ramos, waaxda luqadaha, qaybta robinalapril neil, tori estrella . So [facility] " [phone].    ATENCIÓN: Si joshua gannon, tiene a weller disposición servicios gratuitos de asistencia lingüística. Laron cali [phone].    We comply with applicable federal civil rights laws and [place] laws. We do not discriminate on the basis of race, color, national origin, age, disability, sex, sexual orientation, or gender identity.            Thank you!     Thank you for choosing [facility]  for your care. Our goal is always to provide you with excellent care. Hearing back from our patients is one way we can continue to improve our services. Please take a few minutes to complete the written survey that you may receive in the mail after your visit with us. Thank you!             Your Updated Medication List - Protect others around you: Learn how to safely use, store and throw away your medicines at www.disposemymeds.org.          This list is accurate as of 11/13/18  1:19 PM.  Always use your most recent med list.                   Brand Name Dispense Instructions for use Diagnosis    acetaminophen 325 MG tablet    TYLENOL    100 tablet    Take 2 tablets (650 mg) by mouth every 4 hours as needed for pain        flax seed oil 1000 MG capsule      Take 1 capsule by mouth 2 times daily        levothyroxine 50 MCG tablet    SYNTHROID/LEVOTHROID    90 tablet    Take 1 tablet (50 mcg) by mouth daily    Postoperative hypothyroidism       MULTIVITAMIN TABS   OR      1 TABLET DAILY        TUMS 500 MG chewable tablet   Generic drug:  calcium carbonate      Take 2 chew tab by mouth daily as needed        VITAMIN D-3 PO      Take 1,000 Units by mouth daily.

## 2018-11-13 NOTE — NURSING NOTE
DISCHARGE PLAN:  Next appointments: See patient instruction section  Departure Mode: Ambulatory  Accompanied by: self  3 minutes for nursing discharge (face to face time)     Lilli Russo is here today for Hematology follow up.  Writing nurse seen patient after Medical Oncology appointment to address questions/concerns/coordinate care. Follow up in 1 year with labs.  Patient ambulated by nurse to  to schedule follow up and/or lab appointments.  Imaging scheduled if ordered.  See patient instructions and Oncologist's Progress note for further details. Questions and concerns addressed to patient's satisfaction. Patient verbalized and demonstrated understanding of plan.  Contact information provided and patient is encouraged to call with any that arise in the interim of care.    Parish Rodney, RN, BSN, OCN   Oncology Care Coordinator RN  [facility]  [phone]  11/13/2018, 1:19 PM

## 2018-11-13 NOTE — LETTER
11/13/2018         RE: Lilli Russo  [address] [facility] [address]        Dear Colleague,    Thank you for referring your patient, Lilli Russo, to the [facility]. Please see a copy of my visit note below.    Hematology/ Oncology Follow-up Visit:  Nov 13, 2018    Reason for Visit:   Chief Complaint   Patient presents with     Oncology Clinic Visit     6 month follow up for Diffuse large B-cell lymphoma, unspecified body region      Results     Lab completed today       Oncologic History:    Diffuse large B cell lymphoma (H)  Lilli Russo is known with known history of Non Hodgkin's lymphoma since 2013 when she felt feverish, had chills, poor taste, fatigue, cough, not too much sputum production, indigestion, early satiety, night sweats.  She was found to have pancytopenia with white count 2.4, hemoglobin 10.2, MCV 92, platelets 74, absolute neutrophil count 0.9, diff not significant without premature cells..   CT chest, abdomen and pelvis 09/16/2013:  Mild splenomegaly.  Spleen measures 13.2 cm.  Further extensive infectious disease work up was negative. 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 She was seen by Dr. Fuller and is offered RCHOP [id number]/26/2013 with IT prophylaxis 12 mg MTX  On 9/26 with negative LP and brain MRI. Her presenting LDH is high >900, she received 1 dose of Rasburicase on 9/28/2013. She had RCHOP from 10/2013 to 1/2014. Repeat BM 11/26/2013 - negative for lymphoma by morphology and flow, cyto is nl.  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       Interval History:  Patient returning today for follow-up visit.  She has been feeling well without any recent complaints weight loss night sweats fever or chills.  She denies any nausea vomiting or diarrhea.    Review Of Systems:  Constitutional: Negative for fever, chills, and night sweats.  Skin: negative.  Eyes:  "negative.  Ears/Nose/Throat: negative.  Respiratory: No shortness of breath, dyspnea on exertion, cough, or hemoptysis.  Cardiovascular: negative.  Gastrointestinal: negative.  Genitourinary: negative.  Musculoskeletal: negative.  Neurologic: negative.  Psychiatric: negative.  Hematologic/Lymphatic/Immunologic: negative.  Endocrine: negative.    All other ROS negative unless mentioned in interval history.    Past medical, social, surgical, and family histories reviewed.    Allergies:  Allergies as of 11/13/2018 - Rick as Reviewed 11/13/2018   Allergen Reaction Noted     Ceftazidime Rash 09/24/2013       Current Medications:  Current Outpatient Prescriptions   Medication Sig Dispense Refill     calcium carbonate (TUMS) 500 MG chewable tablet Take 2 chew tab by mouth daily as needed       Cholecalciferol (VITAMIN D-3 PO) Take 1,000 Units by mouth daily.        Flaxseed, Linseed, (FLAX SEED OIL) 1000 MG capsule Take 1 capsule by mouth 2 times daily       levothyroxine (SYNTHROID/LEVOTHROID) 50 MCG tablet Take 1 tablet (50 mcg) by mouth daily 90 tablet 3     MULTIVITAMIN TABS   OR 1 TABLET DAILY       acetaminophen (TYLENOL) 325 MG tablet Take 2 tablets (650 mg) by mouth every 4 hours as needed for pain 100 tablet 0        Physical Exam:  /64 (BP Location: Right arm, Patient Position: Chair, Cuff Size: Adult Regular)  Pulse 66  Temp 97.2  F (36.2  C) (Temporal)  Resp 16  Ht 1.499 m (4' 11\")  Wt 39.6 kg (87 lb 4.8 oz)  SpO2 98%  BMI 17.63 kg/m2  Wt Readings from Last 12 Encounters:   11/13/18 39.6 kg (87 lb 4.8 oz)   06/11/18 39.9 kg (88 lb)   05/15/18 40 kg (88 lb 3.2 oz)   11/07/17 39.7 kg (87 lb 9.6 oz)   06/09/17 42 kg (92 lb 9.6 oz)   05/09/17 42.2 kg (93 lb)   01/02/17 43 kg (94 lb 11.2 oz)   12/23/16 43.1 kg (95 lb)   11/08/16 42.2 kg (93 lb)   06/07/16 40.8 kg (90 lb)   05/12/16 41.3 kg (91 lb 1.6 oz)   05/05/16 41.3 kg (91 lb)     ECOG performance status: 0  GENERAL APPEARANCE: Healthy, alert and in " no acute distress.  HEENT: Sclerae anicteric. PERRLA. Oropharynx without ulcers, lesions, or thrush.  NECK: Supple. No asymmetry or masses.  LYMPHATICS: No palpable cervical, supraclavicular, axillary, or inguinal lymphadenopathy.  RESP: Lungs clear to auscultation bilaterally without rales, rhonchi or wheezes.  CARDIOVASCULAR: Regular rate and rhythm. Normal S1, S2; no S3 or S4. No murmur, gallop, or rub.  ABDOMEN: Soft, nontender. Bowel sounds normal. No palpable organomegaly or masses.  MUSCULOSKELETAL: Extremities without gross deformities noted. No edema of bilateral lower extremities.  SKIN: No suspicious lesions or rashes.  NEURO: Alert and oriented x 3. Cranial nerves II-XII grossly intact.  PSYCHIATRIC: Mentation and affect appear normal.    Laboratory/Imaging Studies:  No visits with results within 2 Week(s) from this visit.  Latest known visit with results is:    Office Visit on 06/11/2018   Component Date Value Ref Range Status     TSH 06/11/2018 2.52  0.40 - 4.00 mU/L Final     T4 Free 06/11/2018 1.10  0.76 - 1.46 ng/dL Final          Assessment and plan:  (C83.30) Diffuse large B-cell lymphoma, unspecified body region (H)  (primary encounter diagnosis)  Patient has no clinical evidence of recurrence of lymphoma.  Laboratory tests were reviewed with the patient.  We will see the patient annually sooner if there are new developments or concerns per    (E89.0) Postoperative hypothyroidism  Patient continue on Synthroid 50 mg orally daily.      The patient is ready to learn, no apparent learning barriers were identified.  Diagnosis and treatment plans were explained to the patient. The patient expressed understanding of the content. The patient asked appropriate questions. The patient questions were answered to her satisfaction.    Chart documentation with Dragon Voice recognition Software. Although reviewed after completion, some words and grammatical errors may remain.    Again, thank you for allowing me  to participate in the care of your patient.        Sincerely,        Wayne Perez MD

## 2018-11-13 NOTE — ASSESSMENT & PLAN NOTE
Lilli Russo is known with known history of Non Hodgkin's lymphoma since 2013 when she felt feverish, had chills, poor taste, fatigue, cough, not too much sputum production, indigestion, early satiety, night sweats.  She was found to have pancytopenia with white count 2.4, hemoglobin 10.2, MCV 92, platelets 74, absolute neutrophil count 0.9, diff not significant without premature cells..   CT chest, abdomen and pelvis 09/16/2013:  Mild splenomegaly.  Spleen measures 13.2 cm.  Further extensive infectious disease work up was negative. 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 She was seen by Dr. Fuller and is offered RCHOP [id number]/26/2013 with IT prophylaxis 12 mg MTX  On 9/26 with negative LP and brain MRI. Her presenting LDH is high >900, she received 1 dose of Rasburicase on 9/28/2013. She had RCHOP from 10/2013 to 1/2014. Repeat BM 11/26/2013 - negative for lymphoma by morphology and flow, cyto is nl.  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

## 2018-11-13 NOTE — PROGRESS NOTES
Hematology/ Oncology Follow-up Visit:  Nov 13, 2018    Reason for Visit:   Chief Complaint   Patient presents with     Oncology Clinic Visit     6 month follow up for Diffuse large B-cell lymphoma, unspecified body region      Results     Lab completed today       Oncologic History:    Diffuse large B cell lymphoma (H)  Lilli Russo is known with known history of Non Hodgkin's lymphoma since 2013 when she felt feverish, had chills, poor taste, fatigue, cough, not too much sputum production, indigestion, early satiety, night sweats.  She was found to have pancytopenia with white count 2.4, hemoglobin 10.2, MCV 92, platelets 74, absolute neutrophil count 0.9, diff not significant without premature cells..   CT chest, abdomen and pelvis 09/16/2013:  Mild splenomegaly.  Spleen measures 13.2 cm.  Further extensive infectious disease work up was negative. 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 She was seen by Dr. Fuller and is offered RCHOP [id number]/26/2013 with IT prophylaxis 12 mg MTX  On 9/26 with negative LP and brain MRI. Her presenting LDH is high >900, she received 1 dose of Rasburicase on 9/28/2013. She had RCHOP from 10/2013 to 1/2014. Repeat BM 11/26/2013 - negative for lymphoma by morphology and flow, cyto is nl.  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       Interval History:  Patient returning today for follow-up visit.  She has been feeling well without any recent complaints weight loss night sweats fever or chills.  She denies any nausea vomiting or diarrhea.    Review Of Systems:  Constitutional: Negative for fever, chills, and night sweats.  Skin: negative.  Eyes: negative.  Ears/Nose/Throat: negative.  Respiratory: No shortness of breath, dyspnea on exertion, cough, or hemoptysis.  Cardiovascular: negative.  Gastrointestinal: negative.  Genitourinary: negative.  Musculoskeletal: negative.  Neurologic:  "negative.  Psychiatric: negative.  Hematologic/Lymphatic/Immunologic: negative.  Endocrine: negative.    All other ROS negative unless mentioned in interval history.    Past medical, social, surgical, and family histories reviewed.    Allergies:  Allergies as of 11/13/2018 - Rick as Reviewed 11/13/2018   Allergen Reaction Noted     Ceftazidime Rash 09/24/2013       Current Medications:  Current Outpatient Prescriptions   Medication Sig Dispense Refill     calcium carbonate (TUMS) 500 MG chewable tablet Take 2 chew tab by mouth daily as needed       Cholecalciferol (VITAMIN D-3 PO) Take 1,000 Units by mouth daily.        Flaxseed, Linseed, (FLAX SEED OIL) 1000 MG capsule Take 1 capsule by mouth 2 times daily       levothyroxine (SYNTHROID/LEVOTHROID) 50 MCG tablet Take 1 tablet (50 mcg) by mouth daily 90 tablet 3     MULTIVITAMIN TABS   OR 1 TABLET DAILY       acetaminophen (TYLENOL) 325 MG tablet Take 2 tablets (650 mg) by mouth every 4 hours as needed for pain 100 tablet 0        Physical Exam:  /64 (BP Location: Right arm, Patient Position: Chair, Cuff Size: Adult Regular)  Pulse 66  Temp 97.2  F (36.2  C) (Temporal)  Resp 16  Ht 1.499 m (4' 11\")  Wt 39.6 kg (87 lb 4.8 oz)  SpO2 98%  BMI 17.63 kg/m2  Wt Readings from Last 12 Encounters:   11/13/18 39.6 kg (87 lb 4.8 oz)   06/11/18 39.9 kg (88 lb)   05/15/18 40 kg (88 lb 3.2 oz)   11/07/17 39.7 kg (87 lb 9.6 oz)   06/09/17 42 kg (92 lb 9.6 oz)   05/09/17 42.2 kg (93 lb)   01/02/17 43 kg (94 lb 11.2 oz)   12/23/16 43.1 kg (95 lb)   11/08/16 42.2 kg (93 lb)   06/07/16 40.8 kg (90 lb)   05/12/16 41.3 kg (91 lb 1.6 oz)   05/05/16 41.3 kg (91 lb)     ECOG performance status: 0  GENERAL APPEARANCE: Healthy, alert and in no acute distress.  HEENT: Sclerae anicteric. PERRLA. Oropharynx without ulcers, lesions, or thrush.  NECK: Supple. No asymmetry or masses.  LYMPHATICS: No palpable cervical, supraclavicular, axillary, or inguinal lymphadenopathy.  RESP: " Lungs clear to auscultation bilaterally without rales, rhonchi or wheezes.  CARDIOVASCULAR: Regular rate and rhythm. Normal S1, S2; no S3 or S4. No murmur, gallop, or rub.  ABDOMEN: Soft, nontender. Bowel sounds normal. No palpable organomegaly or masses.  MUSCULOSKELETAL: Extremities without gross deformities noted. No edema of bilateral lower extremities.  SKIN: No suspicious lesions or rashes.  NEURO: Alert and oriented x 3. Cranial nerves II-XII grossly intact.  PSYCHIATRIC: Mentation and affect appear normal.    Laboratory/Imaging Studies:  No visits with results within 2 Week(s) from this visit.  Latest known visit with results is:    Office Visit on 06/11/2018   Component Date Value Ref Range Status     TSH 06/11/2018 2.52  0.40 - 4.00 mU/L Final     T4 Free 06/11/2018 1.10  0.76 - 1.46 ng/dL Final          Assessment and plan:  (C83.30) Diffuse large B-cell lymphoma, unspecified body region (H)  (primary encounter diagnosis)  Patient has no clinical evidence of recurrence of lymphoma.  Laboratory tests were reviewed with the patient.  We will see the patient annually sooner if there are new developments or concerns per    (E89.0) Postoperative hypothyroidism  Patient continue on Synthroid 50 mg orally daily.      The patient is ready to learn, no apparent learning barriers were identified.  Diagnosis and treatment plans were explained to the patient. The patient expressed understanding of the content. The patient asked appropriate questions. The patient questions were answered to her satisfaction.    Chart documentation with Dragon Voice recognition Software. Although reviewed after completion, some words and grammatical errors may remain.

## 2018-11-13 NOTE — PATIENT INSTRUCTIONS
Please follow up with Dr. Perez in 1 year with labs.  Please come 20-30 minutes early for labs.    Follow Up Date/Time:     If you have any questions or concerns please feel free to call.    If you need to reschedule please call:  Clinic or Lab Appointment - [phone]  Infusion - [phone]  Imaging - [phone]    Parish Rodney RN, BSN, OCN  [facility]   Oncology/Hematology Care Coordinator RN  [facility]  [phone]

## 2018-11-27 ENCOUNTER — TELEPHONE (OUTPATIENT)
Dept: INTERNAL MEDICINE | Facility: CLINIC | Age: 78
End: 2018-11-27

## 2018-11-27 NOTE — TELEPHONE ENCOUNTER
Reason for Call:  Other call back    Detailed comments: patient is calling and wondering if Dr. Roman wants her to have the reclast infusion in January? State that she has it yearly and wondering if she needs to do so this year?    Phone Number Patient can be reached at: Home number on file [phone] (home)    Best Time: any    Can we leave a detailed message on this number? YES    Call taken on 11/27/2018 at 11:29 AM by Katya Lynch

## 2018-11-28 RX ORDER — ZOLEDRONIC ACID 5 MG/100ML
5 INJECTION, SOLUTION INTRAVENOUS ONCE
Status: CANCELLED
Start: 2019-01-01 | End: 2019-01-01

## 2018-11-28 NOTE — TELEPHONE ENCOUNTER
Infusion needs you to place the order for the reclast. Infusion states it needs to be ordered every year.

## 2018-12-19 ENCOUNTER — HOSPITAL ENCOUNTER (OUTPATIENT)
Dept: MAMMOGRAPHY | Facility: CLINIC | Age: 78
Discharge: HOME | End: 2018-12-19
Attending: INTERNAL MEDICINE | Admitting: INTERNAL MEDICINE
Payer: MEDICARE

## 2018-12-19 DIAGNOSIS — Z12.31 VISIT FOR SCREENING MAMMOGRAM: ICD-10-CM

## 2018-12-19 PROCEDURE — 77063 BREAST TOMOSYNTHESIS BI: CPT

## 2019-01-02 NOTE — TELEPHONE ENCOUNTER
Lilli needs the order placed so she can get her reclast infusion, please notify patient when order is placed so she can be scheduled, [phone], ok to leave message.

## 2019-04-20 ENCOUNTER — HOSPITAL ENCOUNTER (EMERGENCY)
Facility: CLINIC | Age: 79
Discharge: HOME | End: 2019-04-20
Attending: EMERGENCY MEDICINE | Admitting: EMERGENCY MEDICINE
Payer: MEDICARE

## 2019-04-20 ENCOUNTER — APPOINTMENT (OUTPATIENT)
Dept: CT IMAGING | Facility: CLINIC | Age: 79
End: 2019-04-20
Attending: EMERGENCY MEDICINE
Payer: MEDICARE

## 2019-04-20 VITALS
SYSTOLIC BLOOD PRESSURE: 148 MMHG | OXYGEN SATURATION: 99 % | TEMPERATURE: 98.1 F | HEART RATE: 73 BPM | RESPIRATION RATE: 18 BRPM | DIASTOLIC BLOOD PRESSURE: 68 MMHG

## 2019-04-20 DIAGNOSIS — S06.9X9A HEAD INJURY WITH LOSS OF CONSCIOUSNESS (H): ICD-10-CM

## 2019-04-20 PROCEDURE — 70450 CT HEAD/BRAIN W/O DYE: CPT

## 2019-04-20 PROCEDURE — A9270 NON-COVERED ITEM OR SERVICE: HCPCS | Performed by: EMERGENCY MEDICINE

## 2019-04-20 PROCEDURE — 99283 EMERGENCY DEPT VISIT LOW MDM: CPT | Mod: Z6 | Performed by: EMERGENCY MEDICINE

## 2019-04-20 PROCEDURE — 25000132 ZZH RX MED GY IP 250 OP 250 PS 637: Performed by: EMERGENCY MEDICINE

## 2019-04-20 PROCEDURE — 99284 EMERGENCY DEPT VISIT MOD MDM: CPT | Mod: 25 | Performed by: EMERGENCY MEDICINE

## 2019-04-20 RX ORDER — ACETAMINOPHEN 500 MG
500 TABLET ORAL ONCE
Status: COMPLETED | OUTPATIENT
Start: 2019-04-20 | End: 2019-04-20

## 2019-04-20 RX ADMIN — ACETAMINOPHEN 500 MG: 500 TABLET ORAL at 18:34
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
## 2019-04-20 NOTE — DISCHARGE INSTRUCTIONS
Return to the emergency department if you develop new or worsening symptoms.  Take Tylenol and or Profen for headache.  Follow-up with your doctor for recheck next week.

## 2019-04-20 NOTE — ED AVS SNAPSHOT
[facility] Emergency Department  [address] [place] [address]  Phone:  [phone]  Fax:  [phone]                                    Lilli Russo   MRN: [id number]    Department:  [facility] Emergency Department   Date of Visit:  4/20/2019           After Visit Summary Signature Page    I have received my discharge instructions, and my questions have been answered. I have discussed any challenges I see with this plan with the nurse or doctor.    ..........................................................................................................................................  Patient/Patient Representative Signature      ..........................................................................................................................................  Patient Representative Print Name and Relationship to Patient    ..................................................               ................................................  Date                                   Time    ..........................................................................................................................................  Reviewed by Signature/Title    ...................................................              ..............................................  Date                                               Time          22EPIC Rev 08/18

## 2019-04-20 NOTE — ED TRIAGE NOTES
Pt comes in after falling around 1400 and hitting her head. This happened in [place]. Pt waited for 2.5 hours to be seen at [facility] before she left without being seen. Pts  states she lost consciousness. Pt states she has mild head pain.

## 2019-04-22 ENCOUNTER — TELEPHONE (OUTPATIENT)
Dept: INTERNAL MEDICINE | Facility: CLINIC | Age: 79
End: 2019-04-22

## 2019-04-22 NOTE — TELEPHONE ENCOUNTER
Reason for Call:  Work in by 4.25 Appointment, Requested Provider:  Brian Roman MD    PCP: Brian Roman    Reason for visit: ED Follow-up 4.20 for head injury from falling.    Duration of symptoms:     Have you been treated for this in the past? No    Additional comments: Pt needs Follow-up appt by 4.25. Please advise.     Can we leave a detailed message on this number? YES    Phone number patient can be reached at: Home number on file [phone] (home)    Best Time: any    Call taken on 4/22/2019 at 1:59 PM by Kelsie Lr

## 2019-04-23 ENCOUNTER — OFFICE VISIT (OUTPATIENT)
Dept: INTERNAL MEDICINE | Facility: CLINIC | Age: 79
End: 2019-04-23
Payer: MEDICARE

## 2019-04-23 VITALS
HEART RATE: 76 BPM | BODY MASS INDEX: 18.35 KG/M2 | SYSTOLIC BLOOD PRESSURE: 122 MMHG | RESPIRATION RATE: 16 BRPM | HEIGHT: 59 IN | OXYGEN SATURATION: 98 % | DIASTOLIC BLOOD PRESSURE: 66 MMHG | TEMPERATURE: 97.7 F | WEIGHT: 91 LBS

## 2019-04-23 DIAGNOSIS — S06.0X1D CONCUSSION WITH LOSS OF CONSCIOUSNESS OF 30 MINUTES OR LESS, SUBSEQUENT ENCOUNTER: ICD-10-CM
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
## 2019-04-23 PROCEDURE — 99213 OFFICE O/P EST LOW 20 MIN: CPT | Performed by: INTERNAL MEDICINE

## 2019-04-23 ASSESSMENT — PAIN SCALES - GENERAL: PAINLEVEL: NO PAIN (0)

## 2019-04-23 ASSESSMENT — MIFFLIN-ST. JEOR: SCORE: 798.4

## 2019-04-23 NOTE — PROGRESS NOTES
"  SUBJECTIVE:   iLlli Russo is a 78 year old female who presents to clinic today for the following   health issues:    ED/UC Followup:    Facility:  [facility]  Date of visit: 4/20/19  Reason for visit: Fall  Head injury  Current Status: Follow up     She was walking in a street after shopping. She tripped and fell down, hit her head and got a bump.  Bump is on the top of her head.  She passed out for a while and first responders and ambulance came.      Was in the ER at [facility] for over two hours so they left and came here.  Head CT was ok, no intracranial bleeding.     Feeling better now, some neck pain is better. Still pain on her head.       Past Medical History:   Diagnosis Date     Cancer (H) 5/2014    lymphoma, large b cell, stem cell transplant     Macular degeneration (senile) of retina, unspecified      Osteoporosis, unspecified      Pure hypercholesterolemia      Unspecified closed fracture of pelvis 01/12/09     Unspecified hypothyroidism      Current Outpatient Medications   Medication     acetaminophen (TYLENOL) 325 MG tablet     calcium carbonate (TUMS) 500 MG chewable tablet     Cholecalciferol (VITAMIN D-3 PO)     Flaxseed, Linseed, (FLAX SEED OIL) 1000 MG capsule     levothyroxine (SYNTHROID/LEVOTHROID) 50 MCG tablet     MULTIVITAMIN TABS   OR     No current facility-administered medications for this visit.      Social History     Tobacco Use     Smoking status: Never Smoker     Smokeless tobacco: Never Used   Substance Use Topics     Alcohol use: No     Alcohol/week: 0.0 oz     Drug use: No     Physical Exam  /66   Pulse 76   Temp 97.7  F (36.5  C) (Temporal)   Resp 16   Ht 1.499 m (4' 11\")   Wt 41.3 kg (91 lb)   SpO2 98%   BMI 18.38 kg/m    General Appearance-healthy, alert, no distress  Head-Normocephalic. No masses, lesions, tenderness or abnormalities  Cardiac-regular rate and rhythm  with normal S1, S2 ; no murmur, rub or gallops      ASSESSMENT:  70-year-old woman who fell a " few days ago, hit her head and lost consciousness.  She was seen in the emergency room, head CT was negative.  She had minor neck pain at that but that is much improved, she is able to move her neck well.  Her headache is getting better.  She is active walking.  On examination she has a little hematoma on the top of her head but nothing else.  He probably has a small concussion I instructed her to continue to rest as long as she has a headache but otherwise she can return to her normal activities.  We will follow-up with her at her physical in June.

## 2019-06-12 ENCOUNTER — TELEPHONE (OUTPATIENT)
Dept: INTERNAL MEDICINE | Facility: CLINIC | Age: 79
End: 2019-06-12

## 2019-06-12 ENCOUNTER — OFFICE VISIT (OUTPATIENT)
Dept: INTERNAL MEDICINE | Facility: CLINIC | Age: 79
End: 2019-06-12
Payer: MEDICARE

## 2019-06-12 VITALS
RESPIRATION RATE: 16 BRPM | TEMPERATURE: 97.4 F | DIASTOLIC BLOOD PRESSURE: 64 MMHG | OXYGEN SATURATION: 100 % | SYSTOLIC BLOOD PRESSURE: 108 MMHG | WEIGHT: 92 LBS | HEART RATE: 78 BPM | BODY MASS INDEX: 18.58 KG/M2

## 2019-06-12 DIAGNOSIS — Z00.00 ENCOUNTER FOR MEDICARE ANNUAL WELLNESS EXAM: ICD-10-CM

## 2019-06-12 DIAGNOSIS — Z12.11 SPECIAL SCREENING FOR MALIGNANT NEOPLASMS, COLON: ICD-10-CM

## 2019-06-12 DIAGNOSIS — C83.30 DIFFUSE LARGE B-CELL LYMPHOMA, UNSPECIFIED BODY REGION (H): Primary | ICD-10-CM

## 2019-06-12 DIAGNOSIS — E89.0 POSTOPERATIVE HYPOTHYROIDISM: ICD-10-CM

## 2019-06-12 LAB
ALBUMIN SERPL-MCNC: 3.9 G/DL (ref 3.4–5)
ALP SERPL-CCNC: 54 U/L (ref 40–150)
ALT SERPL W P-5'-P-CCNC: 23 U/L (ref 0–50)
ANION GAP SERPL CALCULATED.3IONS-SCNC: 5 MMOL/L (ref 3–14)
AST SERPL W P-5'-P-CCNC: 20 U/L (ref 0–45)
BILIRUB SERPL-MCNC: 0.4 MG/DL (ref 0.2–1.3)
BUN SERPL-MCNC: 15 MG/DL (ref 7–30)
CALCIUM SERPL-MCNC: 9.4 MG/DL (ref 8.5–10.1)
CHLORIDE SERPL-SCNC: 105 MMOL/L (ref 94–109)
CO2 SERPL-SCNC: 33 MMOL/L (ref 20–32)
CREAT SERPL-MCNC: 0.76 MG/DL (ref 0.52–1.04)
ERYTHROCYTE [DISTWIDTH] IN BLOOD BY AUTOMATED COUNT: 12.3 % (ref 10–15)
GFR SERPL CREATININE-BSD FRML MDRD: 74 ML/MIN/{1.73_M2}
GLUCOSE SERPL-MCNC: 102 MG/DL (ref 70–99)
HCT VFR BLD AUTO: 40.2 % (ref 35–47)
HGB BLD-MCNC: 13.7 G/DL (ref 11.7–15.7)
LDH SERPL L TO P-CCNC: 189 U/L (ref 81–234)
MCH RBC QN AUTO: 34.3 PG (ref 26.5–33)
MCHC RBC AUTO-ENTMCNC: 34.1 G/DL (ref 31.5–36.5)
MCV RBC AUTO: 101 FL (ref 78–100)
PLATELET # BLD AUTO: 136 10E9/L (ref 150–450)
POTASSIUM SERPL-SCNC: 3.7 MMOL/L (ref 3.4–5.3)
PROT SERPL-MCNC: 6.9 G/DL (ref 6.8–8.8)
RBC # BLD AUTO: 4 10E12/L (ref 3.8–5.2)
SODIUM SERPL-SCNC: 143 MMOL/L (ref 133–144)
T4 FREE SERPL-MCNC: 0.89 NG/DL (ref 0.76–1.46)
TSH SERPL DL<=0.005 MIU/L-ACNC: 5.85 MU/L (ref 0.4–4)
WBC # BLD AUTO: 6.1 10E9/L (ref 4–11)

## 2019-06-12 PROCEDURE — 36415 COLL VENOUS BLD VENIPUNCTURE: CPT | Performed by: INTERNAL MEDICINE

## 2019-06-12 PROCEDURE — G0439 PPPS, SUBSEQ VISIT: HCPCS | Performed by: INTERNAL MEDICINE

## 2019-06-12 PROCEDURE — 85027 COMPLETE CBC AUTOMATED: CPT | Performed by: INTERNAL MEDICINE

## 2019-06-12 PROCEDURE — 83615 LACTATE (LD) (LDH) ENZYME: CPT | Performed by: INTERNAL MEDICINE

## 2019-06-12 PROCEDURE — 80053 COMPREHEN METABOLIC PANEL: CPT | Performed by: INTERNAL MEDICINE

## 2019-06-12 PROCEDURE — 84443 ASSAY THYROID STIM HORMONE: CPT | Performed by: INTERNAL MEDICINE

## 2019-06-12 PROCEDURE — 84439 ASSAY OF FREE THYROXINE: CPT | Performed by: INTERNAL MEDICINE

## 2019-06-12 RX ORDER — LEVOTHYROXINE SODIUM 50 UG/1
50 TABLET ORAL DAILY
Qty: 90 TABLET | Refills: 3 | Status: SHIPPED | OUTPATIENT
Start: 2019-06-12 | End: 2020-06-15

## 2019-06-12 ASSESSMENT — ACTIVITIES OF DAILY LIVING (ADL): CURRENT_FUNCTION: NO ASSISTANCE NEEDED

## 2019-06-12 ASSESSMENT — PAIN SCALES - GENERAL: PAINLEVEL: NO PAIN (0)

## 2019-06-12 NOTE — PATIENT INSTRUCTIONS
Patient Education   Personalized Prevention Plan  You are due for the preventive services outlined below.  Your care team is available to assist you in scheduling these services.  If you have already completed any of these items, please share that information with your care team to update in your medical record.  Health Maintenance Due   Topic Date Due     Zoster (Shingles) Vaccine (1 of 2) 06/17/1990     Annual Wellness Visit  06/01/2016       Understanding OneCard MyPlate  The USDA (U.S. Department of Agriculture) has guidelines to help you make healthy food choices. These are called MyPlate. MyPlate shows the food groups that make up healthy meals using the image of a place setting. Before you eat, think about the healthiest choices for what to put onto your plate or into your cup or bowl. To learn more about building a healthy plate, visit www.chooseAirtaskerplate.gov.    The food groups    Fruits. Any fruit or 100% fruit juice counts as part of the Fruit Group. Fruits may be fresh, canned, frozen, or dried, and may be whole, cut-up, or pureed. Make half your plate fruits and vegetables.    Vegetables. Any vegetable or 100% vegetable juice counts as a member of the Vegetable Group. Vegetables may be fresh, frozen, canned, or dried. They can be served raw or cooked and may be whole, cut-up, or mashed. Make half your plate fruits and vegetables.    Grains. All foods made from grains are part of the Grains Group. These include wheat, rice, oats, cornmeal, and barley such as bread, pasta, oatmeal, cereal, tortillas, and grits. Grains should be no more than a quarter of your plate. At least half of your grains should be whole grains.    Protein. This group includes meat, poultry, seafood, beans and peas, eggs, processed soy products (like tofu), nuts (including nut butters), and seeds. Make protein choices no more than a quarter of your plate. Meat and poultry choices should be lean or low fat.    Dairy. All fluid milk  products and foods made from milk that contain calcium, like yogurt and cheese, are part of the Dairy Group. (Foods that have little calcium, such as cream, butter, and cream cheese, are not part of the group.) Most dairy choices should be low-fat or fat-free.    Oils. These are fats that are liquid at room temperature. They include canola, corn, olive, soybean, and sunflower oil. Foods that are mainly oil include mayonnaise, certain salad dressings, and soft margarines. You should have only 5 to 7 teaspoons of oils a day. You probably already get this much from the food you eat.  Date Last Reviewed: 8/1/2017 2000-2018 [employer]. [address] [place] [address], [place] [address]. All rights reserved. This information is not intended as a substitute for professional medical care. Always follow your healthcare professional's instructions.

## 2019-06-12 NOTE — TELEPHONE ENCOUNTER
----- Message from Brian oRman MD sent at 6/12/2019  4:25 PM CDT -----  Labs are ok, continue her medications.

## 2019-06-12 NOTE — PROGRESS NOTES
"SUBJECTIVE:   Lilli Russo is a 78 year old female who presents for Preventive Visit.    Are you in the first 12 months of your Medicare coverage?  No    Healthy Habits:     In general, how would you rate your overall health?  Good    Frequency of exercise:  6-7 days/week    Duration of exercise:  Greater than 60 minutes    Do you usually eat at least 4 servings of fruit and vegetables a day, include whole grains    & fiber and avoid regularly eating high fat or \"junk\" foods?  No    Taking medications regularly:  No    Barriers to taking medications:  None    Medication side effects:  None    Ability to successfully perform activities of daily living:  No assistance needed    Home Safety:  No safety concerns identified    Hearing Impairment:  No hearing concerns    In the past 6 months, have you been bothered by leaking of urine?  No    In general, how would you rate your overall mental or emotional health?  Good      PHQ-2 Total Score: 0    Additional concerns today:  No    Doing ok, no more falls, needs thyroid refills.    Exercise with walking and biking at home.        Do you feel safe in your environment? Yes    Do you have a Health Care Directive? No: Advance care planning was reviewed with patient; patient declined at this time.    Fall risk  Fallen 2 or more times in the past year?: No  Any fall with injury in the past year?: No    Cognitive Screening   1) Repeat 3 items (Leader, Season, Table)    2) Clock draw: NORMAL  3) 3 item recall: Recalls 2 objects   Results: NORMAL clock, 1-2 items recalled: COGNITIVE IMPAIRMENT LESS LIKELY    Mini-CogTM Copyright S Rodo. Licensed by the author for use in [facility]; reprinted with permission (lilli@.[facility]). All rights reserved.      Do you have sleep apnea, excessive snoring or daytime drowsiness?: no    Reviewed and updated as needed this visit by clinical staff  Tobacco  Allergies  Meds         Reviewed and updated as needed this visit by " Provider        Social History     Tobacco Use     Smoking status: Never Smoker     Smokeless tobacco: Never Used   Substance Use Topics     Alcohol use: No     Alcohol/week: 0.0 oz     If you drink alcohol do you typically have >3 drinks per day or >7 drinks per week? No    Alcohol Use 6/1/2015   Prescreen: >3 drinks/day or >7 drinks/week? The patient does not drink >3 drinks per day nor >7 drinks per week.         Current providers sharing in care for this patient include:   Patient Care Team:  Brian Roman MD as PCP - General (Internal Medicine)  Parish Rodney RN as Registered Nurse (Hematology & Oncology)  Brian Roman MD as Assigned PCP    The following health maintenance items are reviewed in Epic and correct as of today:  Health Maintenance   Topic Date Due     ZOSTER IMMUNIZATION (1 of 2) 06/17/1990     MEDICARE ANNUAL WELLNESS VISIT  06/01/2016     PHQ-2  01/01/2019     LIPID  06/01/2020     ADVANCED DIRECTIVE PLANNING  05/05/2021     DTAP/TDAP/TD IMMUNIZATION (4 - Td) 05/12/2026     DEXA  Completed     INFLUENZA VACCINE  Completed     IPV IMMUNIZATION  Aged Out     MENINGITIS IMMUNIZATION  Aged Out     Pneumonia Vaccine:already done per her.    Review of Systems  CONSTITUTIONAL: NEGATIVE for fever, chills, change in weight  INTEGUMENTARY/SKIN: NEGATIVE for worrisome rashes, moles or lesions  EYES: NEGATIVE for vision changes or irritation  ENT/MOUTH: NEGATIVE for ear, mouth and throat problems  RESP: NEGATIVE for significant cough or SOB  CV: NEGATIVE for chest pain, palpitations or peripheral edema  GI: NEGATIVE for nausea, abdominal pain, heartburn, or change in bowel habits  : NEGATIVE for frequency, dysuria, or hematuria  MUSCULOSKELETAL: NEGATIVE for significant arthralgias or myalgia  NEURO: NEGATIVE for weakness, dizziness or paresthesias  ENDOCRINE: NEGATIVE for temperature intolerance, skin/hair changes  HEME: NEGATIVE for bleeding problems  PSYCHIATRIC: NEGATIVE for changes in mood or  "affect    OBJECTIVE:   /64   Pulse 78   Temp 97.4  F (36.3  C) (Temporal)   Resp 16   Wt 41.7 kg (92 lb)   SpO2 100%   BMI 18.58 kg/m   Estimated body mass index is 18.58 kg/m  as calculated from the following:    Height as of 4/23/19: 1.499 m (4' 11\").    Weight as of this encounter: 41.7 kg (92 lb).  Physical Exam  GENERAL: healthy, alert and no distress  EYES: Eyes grossly normal to inspection, PERRL and conjunctivae and sclerae normal  HENT: ear canals and TM's normal, nose and mouth without ulcers or lesions  NECK: no adenopathy, no asymmetry, masses, or scars and thyroid normal to palpation  RESP: lungs clear to auscultation - no rales, rhonchi or wheezes  CV: regular rate and rhythm, normal S1 S2, no S3 or S4, no murmur, click or rub, no peripheral edema and peripheral pulses strong  ABDOMEN: soft, nontender, no hepatosplenomegaly, no masses and bowel sounds normal  MS: no gross musculoskeletal defects noted, no edema  SKIN: no suspicious lesions or rashes  NEURO: Normal strength and tone, mentation intact and speech normal  PSYCH: mentation appears normal, affect normal/bright    ASSESSMENT / PLAN:       ICD-10-CM    1. Diffuse large B-cell lymphoma, unspecified body region (H) C83.30 CBC with platelets     Lactate Dehydrogenase     Comprehensive metabolic panel   2. Postoperative hypothyroidism E89.0 levothyroxine (SYNTHROID/LEVOTHROID) 50 MCG tablet     TSH with free T4 reflex   3. Special screening for malignant neoplasms, colon Z12.11 Fecal colorectal cancer screen (FIT)   4. Encounter for Medicare annual wellness exam Z00.00      She is doing well, old lymphoma had stem cell transplant, doing well, will check labs.  Hypothyroidism will check tsh and refill medication.    End of Life Planning:  Patient currently has an advanced directive: No.  I have verified the patient's ablity to prepare an advanced directive/make health care decisions.  Literature was provided to assist patient in " "preparing an advanced directive.    COUNSELING:  Reviewed preventive health counseling, as reflected in patient instructions       Regular exercise       Healthy diet/nutrition    Estimated body mass index is 18.58 kg/m  as calculated from the following:    Height as of 4/23/19: 1.499 m (4' 11\").    Weight as of this encounter: 41.7 kg (92 lb).         reports that she has never smoked. She has never used smokeless tobacco.      Appropriate preventive services were discussed with this patient, including applicable screening as appropriate for cardiovascular disease, diabetes, osteopenia/osteoporosis, and glaucoma.  As appropriate for age/gender, discussed screening for colorectal cancer, prostate cancer, breast cancer, and cervical cancer. Checklist reviewing preventive services available has been given to the patient.    Reviewed patients plan of care and provided an AVS. The Basic Care Plan (routine screening as documented in Health Maintenance) for Lilli meets the Care Plan requirement. This Care Plan has been established and reviewed with the Patient.    Counseling Resources:  ATP IV Guidelines  Pooled Cohorts Equation Calculator  Breast Cancer Risk Calculator  FRAX Risk Assessment  ICSI Preventive Guidelines  Dietary Guidelines for Americans, 2010  USDA's MyPlate  ASA Prophylaxis  Lung CA Screening    Brian Roman MD  [facility]    Identified Health Risks:    The patient was counseled and encouraged to consider modifying their diet and eating habits. She was provided with information on recommended healthy diet options.  "

## 2019-06-13 PROCEDURE — 82274 ASSAY TEST FOR BLOOD FECAL: CPT | Performed by: INTERNAL MEDICINE

## 2019-06-17 ENCOUNTER — TELEPHONE (OUTPATIENT)
Dept: INTERNAL MEDICINE | Facility: CLINIC | Age: 79
End: 2019-06-17

## 2019-06-17 DIAGNOSIS — Z12.11 SPECIAL SCREENING FOR MALIGNANT NEOPLASMS, COLON: ICD-10-CM

## 2019-06-17 LAB — HEMOCCULT STL QL IA: NEGATIVE

## 2019-06-17 NOTE — TELEPHONE ENCOUNTER
Patient was informed that her FIT test was negative. Patient declined results to be mailed to her.    Jace Drake CMA

## 2019-11-21 ENCOUNTER — ONCOLOGY VISIT (OUTPATIENT)
Dept: ONCOLOGY | Facility: CLINIC | Age: 79
End: 2019-11-21
Payer: MEDICARE

## 2019-11-21 VITALS
DIASTOLIC BLOOD PRESSURE: 68 MMHG | TEMPERATURE: 97.2 F | BODY MASS INDEX: 17.01 KG/M2 | OXYGEN SATURATION: 94 % | HEIGHT: 59 IN | SYSTOLIC BLOOD PRESSURE: 132 MMHG | HEART RATE: 66 BPM | WEIGHT: 84.4 LBS

## 2019-11-21 DIAGNOSIS — E89.0 POSTOPERATIVE HYPOTHYROIDISM: ICD-10-CM

## 2019-11-21 DIAGNOSIS — C83.30 DIFFUSE LARGE B-CELL LYMPHOMA, UNSPECIFIED BODY REGION (H): Primary | ICD-10-CM

## 2019-11-21 LAB
ALBUMIN SERPL-MCNC: 4.3 G/DL (ref 3.4–5)
ALP SERPL-CCNC: 51 U/L (ref 40–150)
ALT SERPL W P-5'-P-CCNC: 19 U/L (ref 0–50)
ANION GAP SERPL CALCULATED.3IONS-SCNC: 2 MMOL/L (ref 3–14)
AST SERPL W P-5'-P-CCNC: 19 U/L (ref 0–45)
BASOPHILS # BLD AUTO: 0 10E9/L (ref 0–0.2)
BASOPHILS NFR BLD AUTO: 0.2 %
BILIRUB SERPL-MCNC: 0.5 MG/DL (ref 0.2–1.3)
BUN SERPL-MCNC: 17 MG/DL (ref 7–30)
CALCIUM SERPL-MCNC: 8.7 MG/DL (ref 8.5–10.1)
CHLORIDE SERPL-SCNC: 107 MMOL/L (ref 94–109)
CO2 SERPL-SCNC: 33 MMOL/L (ref 20–32)
CREAT SERPL-MCNC: 0.76 MG/DL (ref 0.52–1.04)
DIFFERENTIAL METHOD BLD: ABNORMAL
EOSINOPHIL NFR BLD AUTO: 0.9 %
ERYTHROCYTE [DISTWIDTH] IN BLOOD BY AUTOMATED COUNT: 12.3 % (ref 10–15)
GFR SERPL CREATININE-BSD FRML MDRD: 74 ML/MIN/{1.73_M2}
GLUCOSE SERPL-MCNC: 95 MG/DL (ref 70–99)
HCT VFR BLD AUTO: 39.9 % (ref 35–47)
HGB BLD-MCNC: 13.3 G/DL (ref 11.7–15.7)
IMM GRANULOCYTES # BLD: 0 10E9/L (ref 0–0.4)
IMM GRANULOCYTES NFR BLD: 0.4 %
LDH SERPL L TO P-CCNC: 184 U/L (ref 81–234)
LYMPHOCYTES # BLD AUTO: 1.9 10E9/L (ref 0.8–5.3)
LYMPHOCYTES NFR BLD AUTO: 34.8 %
MCH RBC QN AUTO: 33.4 PG (ref 26.5–33)
MCHC RBC AUTO-ENTMCNC: 33.3 G/DL (ref 31.5–36.5)
MCV RBC AUTO: 100 FL (ref 78–100)
MONOCYTES # BLD AUTO: 0.4 10E9/L (ref 0–1.3)
MONOCYTES NFR BLD AUTO: 7.7 %
NEUTROPHILS # BLD AUTO: 3.1 10E9/L (ref 1.6–8.3)
NEUTROPHILS NFR BLD AUTO: 56 %
NRBC # BLD AUTO: 0 10*3/UL
NRBC BLD AUTO-RTO: 0 /100
PLATELET # BLD AUTO: 139 10E9/L (ref 150–450)
POTASSIUM SERPL-SCNC: 3.9 MMOL/L (ref 3.4–5.3)
PROT SERPL-MCNC: 6.9 G/DL (ref 6.8–8.8)
RBC # BLD AUTO: 3.98 10E12/L (ref 3.8–5.2)
SODIUM SERPL-SCNC: 142 MMOL/L (ref 133–144)
WBC # BLD AUTO: 5.5 10E9/L (ref 4–11)

## 2019-11-21 PROCEDURE — 99214 OFFICE O/P EST MOD 30 MIN: CPT | Performed by: INTERNAL MEDICINE

## 2019-11-21 PROCEDURE — 85025 COMPLETE CBC W/AUTO DIFF WBC: CPT | Performed by: INTERNAL MEDICINE

## 2019-11-21 PROCEDURE — 80053 COMPREHEN METABOLIC PANEL: CPT | Performed by: INTERNAL MEDICINE

## 2019-11-21 PROCEDURE — 83615 LACTATE (LD) (LDH) ENZYME: CPT | Performed by: INTERNAL MEDICINE

## 2019-11-21 PROCEDURE — 36415 COLL VENOUS BLD VENIPUNCTURE: CPT | Performed by: INTERNAL MEDICINE

## 2019-11-21 ASSESSMENT — MIFFLIN-ST. JEOR: SCORE: 763.47

## 2019-11-21 NOTE — ASSESSMENT & PLAN NOTE
Lilli Russo is known with known history of Non Hodgkin's lymphoma since 2013 when she felt feverish, had chills, poor taste, fatigue, cough, not too much sputum production, indigestion, early satiety, night sweats.  She was found to have pancytopenia with white count 2.4, hemoglobin 10.2, MCV 92, platelets 74, absolute neutrophil count 0.9, diff not significant without premature cells..   CT chest, abdomen and pelvis 09/16/2013:  Mild splenomegaly.  Spleen measures 13.2 cm.  Further extensive infectious disease work up was negative. 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 She was seen by Dr. Fuller and is offered RCHOP [id number]/26/2013 with IT prophylaxis 12 mg MTX  On 9/26 with negative LP and brain MRI. Her presenting LDH is high >900, she received 1 dose of Rasburicase on 9/28/2013. She had RCHOP from 10/2013 to 1/2014. Repeat BM 11/26/2013 - negative for lymphoma by morphology and flow, cyto is nl.  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

## 2019-11-21 NOTE — PROGRESS NOTES
Hematology/ Oncology Follow-up Visit:  Nov 21, 2019    Reason for Visit:   Chief Complaint   Patient presents with     Oncology Clinic Visit     1 year follow up-Diffuse large B-cell lymphoma, unspecified body region      Results     labs today       Oncologic History:  Diffuse large B cell lymphoma (H)  Lilli Russo is known with known history of Non Hodgkin's lymphoma since 2013 when she felt feverish, had chills, poor taste, fatigue, cough, not too much sputum production, indigestion, early satiety, night sweats.  She was found to have pancytopenia with white count 2.4, hemoglobin 10.2, MCV 92, platelets 74, absolute neutrophil count 0.9, diff not significant without premature cells..   CT chest, abdomen and pelvis 09/16/2013:  Mild splenomegaly.  Spleen measures 13.2 cm.  Further extensive infectious disease work up was negative. 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 She was seen by Dr. Fuller and is offered RCHOP [id number]/26/2013 with IT prophylaxis 12 mg MTX  On 9/26 with negative LP and brain MRI. Her presenting LDH is high >900, she received 1 dose of Rasburicase on 9/28/2013. She had RCHOP from 10/2013 to 1/2014. Repeat BM 11/26/2013 - negative for lymphoma by morphology and flow, cyto is nl.  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       Interval History:  Return today for follow-up visit.  She has been complaining of increasing weight loss and fatigue lately.  She denies any night sweats or fever or chills.  She denies any shortness of breath or cough or wheezing.  She denies any abdominal pain or difficulty with swallowing.    Review Of Systems:  Constitutional: Negative for fever, chills, and night sweats.  Weight loss and fatigue as mentioned above  Skin: negative.  Eyes: negative.  Ears/Nose/Throat: negative.  Respiratory: No shortness of breath, dyspnea on exertion, cough, or  "hemoptysis.  Cardiovascular: negative.  Gastrointestinal: negative.  Genitourinary: negative.  Musculoskeletal: negative.  Neurologic: negative.  Psychiatric: negative.  Hematologic/Lymphatic/Immunologic: negative.  Endocrine: negative.    All other ROS negative unless mentioned in interval history.    Past medical, social, surgical, and family histories reviewed.    Allergies:  Allergies as of 11/21/2019 - Reviewed 11/21/2019   Allergen Reaction Noted     Ceftazidime Rash 09/24/2013       Current Medications:  Current Outpatient Medications   Medication Sig Dispense Refill     acetaminophen (TYLENOL) 325 MG tablet Take 2 tablets (650 mg) by mouth every 4 hours as needed for pain 100 tablet 0     Calcium Carb-Cholecalciferol (CALCIUM 600 + D PO) Take by mouth daily       calcium carbonate (TUMS) 500 MG chewable tablet Take 2 chew tab by mouth daily as needed       Cholecalciferol (VITAMIN D-3 PO) Take 1,000 Units by mouth daily.        Flaxseed, Linseed, (FLAX SEED OIL) 1000 MG capsule Take 1 capsule by mouth 2 times daily       levothyroxine (SYNTHROID/LEVOTHROID) 50 MCG tablet Take 1 tablet (50 mcg) by mouth daily 90 tablet 3     MULTIVITAMIN TABS   OR 1 TABLET DAILY          Physical Exam:  /68   Pulse 66   Temp 97.2  F (36.2  C) (Oral)   Ht 1.499 m (4' 11\")   Wt 38.3 kg (84 lb 6.4 oz)   SpO2 94%   BMI 17.05 kg/m    Wt Readings from Last 12 Encounters:   11/21/19 38.3 kg (84 lb 6.4 oz)   06/12/19 41.7 kg (92 lb)   04/23/19 41.3 kg (91 lb)   11/13/18 39.6 kg (87 lb 4.8 oz)   06/11/18 39.9 kg (88 lb)   05/15/18 40 kg (88 lb 3.2 oz)   11/07/17 39.7 kg (87 lb 9.6 oz)   06/09/17 42 kg (92 lb 9.6 oz)   05/09/17 42.2 kg (93 lb)   01/02/17 43 kg (94 lb 11.2 oz)   12/23/16 43.1 kg (95 lb)   11/08/16 42.2 kg (93 lb)     ECOG performance status: 0  GENERAL APPEARANCE: Healthy, alert and in no acute distress.  HEENT: Sclerae anicteric. PERRLA. Oropharynx without ulcers, lesions, or thrush.  NECK: Supple. No " asymmetry or masses.  LYMPHATICS: No palpable cervical, supraclavicular, axillary, or inguinal lymphadenopathy.  RESP: Lungs clear to auscultation bilaterally without rales, rhonchi or wheezes.  CARDIOVASCULAR: Regular rate and rhythm. Normal S1, S2; no S3 or S4. No murmur, gallop, or rub.  ABDOMEN: Soft, nontender. Bowel sounds normal. No palpable organomegaly or masses.  MUSCULOSKELETAL: Extremities without gross deformities noted. No edema of bilateral lower extremities.  SKIN: No suspicious lesions or rashes.  NEURO: Alert and oriented x 3. Cranial nerves II-XII grossly intact.  PSYCHIATRIC: Mentation and affect appear normal.    Laboratory/Imaging Studies:  No visits with results within 2 Week(s) from this visit.   Latest known visit with results is:   Orders Only on 06/17/2019   Component Date Value Ref Range Status     Occult Blood Scn FIT 06/13/2019 Negative  NEG^Negative Final          Assessment and plan:    (C83.30) Diffuse large B-cell lymphoma, unspecified body region (H)  (primary encounter diagnosis)  I reviewed with the patient today most recent laboratory test.  There is no clinical evidence of lymphoma recurrence.  However, because of the increasing weight loss lately, I will arrange for CT scan of chest abdomen and  pelvis.  If normal we will continue with annual follow-up.    (E89.0) Postoperative hypothyroidism  Patient continues on Synthroid 50 mcg orally daily.    The patient is ready to learn, no apparent learning barriers were identified.  Diagnosis and treatment plans were explained to the patient. The patient expressed understanding of the content. The patient asked appropriate questions. The patient questions were answered to her satisfaction.    Chart documentation with Dragon Voice recognition Software. Although reviewed after completion, some words and grammatical errors may remain.

## 2019-11-21 NOTE — NURSING NOTE
DISCHARGE PLAN:  Next appointments: See patient instruction section  Departure Mode: Ambulatory  Accompanied by: self  8 minutes for nursing discharge (face to face time)     Lilli Russo is here today for 1 year follow up.  Writing nurse seen patient after Medical Oncology appointment to address questions/concerns/coordinate care. Patient ambulated by nurse to  to schedule follow up and/or lab appointments.  Imaging scheduled if ordered.  Patient to have CT scan now, scheduled for 11/22/19. Patient to follow up in 1 year with lab work unless directed otherwise.  See patient instructions and Oncologist's Progress note for further details. Questions and concerns addressed to patient's satisfaction. Patient verbalized and demonstrated understanding of plan.  Contact information provided and patient is encouraged to call with any that arise in the interim of care.    Vane RODRIGUEZ CMA   [facility] Cancer/Hematology [facility]   [phone]  11/21/2019, 1:30 PM

## 2019-11-21 NOTE — LETTER
11/21/2019         RE: Lilli Russo  [address] [facility] [id number]        Dear Colleague,    Thank you for referring your patient, Lilli Russo, to the [facility]. Please see a copy of my visit note below.    Hematology/ Oncology Follow-up Visit:  Nov 21, 2019    Reason for Visit:   Chief Complaint   Patient presents with     Oncology Clinic Visit     1 year follow up-Diffuse large B-cell lymphoma, unspecified body region      Results     labs today       Oncologic History:  Diffuse large B cell lymphoma (H)  Lilli Russo is known with known history of Non Hodgkin's lymphoma since 2013 when she felt feverish, had chills, poor taste, fatigue, cough, not too much sputum production, indigestion, early satiety, night sweats.  She was found to have pancytopenia with white count 2.4, hemoglobin 10.2, MCV 92, platelets 74, absolute neutrophil count 0.9, diff not significant without premature cells..   CT chest, abdomen and pelvis 09/16/2013:  Mild splenomegaly.  Spleen measures 13.2 cm.  Further extensive infectious disease work up was negative. 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 She was seen by Dr. Fuller and is offered RCHOP [id number]/26/2013 with IT prophylaxis 12 mg MTX  On 9/26 with negative LP and brain MRI. Her presenting LDH is high >900, she received 1 dose of Rasburicase on 9/28/2013. She had RCHOP from 10/2013 to 1/2014. Repeat BM 11/26/2013 - negative for lymphoma by morphology and flow, cyto is nl.  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       Interval History:  Return today for follow-up visit.  She has been complaining of increasing weight loss and fatigue lately.  She denies any night sweats or fever or chills.  She denies any shortness of breath or cough or wheezing.  She denies any abdominal pain or difficulty with swallowing.    Review Of Systems:  Constitutional:  "Negative for fever, chills, and night sweats.  Weight loss and fatigue as mentioned above  Skin: negative.  Eyes: negative.  Ears/Nose/Throat: negative.  Respiratory: No shortness of breath, dyspnea on exertion, cough, or hemoptysis.  Cardiovascular: negative.  Gastrointestinal: negative.  Genitourinary: negative.  Musculoskeletal: negative.  Neurologic: negative.  Psychiatric: negative.  Hematologic/Lymphatic/Immunologic: negative.  Endocrine: negative.    All other ROS negative unless mentioned in interval history.    Past medical, social, surgical, and family histories reviewed.    Allergies:  Allergies as of 11/21/2019 - Reviewed 11/21/2019   Allergen Reaction Noted     Ceftazidime Rash 09/24/2013       Current Medications:  Current Outpatient Medications   Medication Sig Dispense Refill     acetaminophen (TYLENOL) 325 MG tablet Take 2 tablets (650 mg) by mouth every 4 hours as needed for pain 100 tablet 0     Calcium Carb-Cholecalciferol (CALCIUM 600 + D PO) Take by mouth daily       calcium carbonate (TUMS) 500 MG chewable tablet Take 2 chew tab by mouth daily as needed       Cholecalciferol (VITAMIN D-3 PO) Take 1,000 Units by mouth daily.        Flaxseed, Linseed, (FLAX SEED OIL) 1000 MG capsule Take 1 capsule by mouth 2 times daily       levothyroxine (SYNTHROID/LEVOTHROID) 50 MCG tablet Take 1 tablet (50 mcg) by mouth daily 90 tablet 3     MULTIVITAMIN TABS   OR 1 TABLET DAILY          Physical Exam:  /68   Pulse 66   Temp 97.2  F (36.2  C) (Oral)   Ht 1.499 m (4' 11\")   Wt 38.3 kg (84 lb 6.4 oz)   SpO2 94%   BMI 17.05 kg/m     Wt Readings from Last 12 Encounters:   11/21/19 38.3 kg (84 lb 6.4 oz)   06/12/19 41.7 kg (92 lb)   04/23/19 41.3 kg (91 lb)   11/13/18 39.6 kg (87 lb 4.8 oz)   06/11/18 39.9 kg (88 lb)   05/15/18 40 kg (88 lb 3.2 oz)   11/07/17 39.7 kg (87 lb 9.6 oz)   06/09/17 42 kg (92 lb 9.6 oz)   05/09/17 42.2 kg (93 lb)   01/02/17 43 kg (94 lb 11.2 oz)   12/23/16 43.1 kg (95 lb) "   11/08/16 42.2 kg (93 lb)     ECOG performance status: 0  GENERAL APPEARANCE: Healthy, alert and in no acute distress.  HEENT: Sclerae anicteric. PERRLA. Oropharynx without ulcers, lesions, or thrush.  NECK: Supple. No asymmetry or masses.  LYMPHATICS: No palpable cervical, supraclavicular, axillary, or inguinal lymphadenopathy.  RESP: Lungs clear to auscultation bilaterally without rales, rhonchi or wheezes.  CARDIOVASCULAR: Regular rate and rhythm. Normal S1, S2; no S3 or S4. No murmur, gallop, or rub.  ABDOMEN: Soft, nontender. Bowel sounds normal. No palpable organomegaly or masses.  MUSCULOSKELETAL: Extremities without gross deformities noted. No edema of bilateral lower extremities.  SKIN: No suspicious lesions or rashes.  NEURO: Alert and oriented x 3. Cranial nerves II-XII grossly intact.  PSYCHIATRIC: Mentation and affect appear normal.    Laboratory/Imaging Studies:  No visits with results within 2 Week(s) from this visit.   Latest known visit with results is:   Orders Only on 06/17/2019   Component Date Value Ref Range Status     Occult Blood Scn FIT 06/13/2019 Negative  NEG^Negative Final          Assessment and plan:    (C83.30) Diffuse large B-cell lymphoma, unspecified body region (H)  (primary encounter diagnosis)  I reviewed with the patient today most recent laboratory test.  There is no clinical evidence of lymphoma recurrence.  However, because of the increasing weight loss lately, I will arrange for CT scan of chest abdomen and  pelvis.  If normal we will continue with annual follow-up.    (E89.0) Postoperative hypothyroidism  Patient continues on Synthroid 50 mcg orally daily.    The patient is ready to learn, no apparent learning barriers were identified.  Diagnosis and treatment plans were explained to the patient. The patient expressed understanding of the content. The patient asked appropriate questions. The patient questions were answered to her satisfaction.    Chart documentation with  [employer] Voice recognition Software. Although reviewed after completion, some words and grammatical errors may remain.    Again, thank you for allowing me to participate in the care of your patient.        Sincerely,        Wayne Perez MD

## 2019-11-21 NOTE — NURSING NOTE
"Oncology Rooming Note    November 21, 2019 1:00 PM   Lilli Russo is a 79 year old female who presents for:    Chief Complaint   Patient presents with     Oncology Clinic Visit     1 year follow up-Diffuse large B-cell lymphoma, unspecified body region      Results     labs today     Initial Vitals: There were no vitals taken for this visit. Estimated body mass index is 18.58 kg/m  as calculated from the following:    Height as of 4/23/19: 1.499 m (4' 11\").    Weight as of 6/12/19: 41.7 kg (92 lb). There is no height or weight on file to calculate BSA.  Data Unavailable Comment: Data Unavailable   No LMP recorded. Patient is postmenopausal.  Allergies reviewed: Yes  Medications reviewed: Yes    Medications: Medication refills not needed today.  Pharmacy name entered into EPIC:    [facility] - [place], [place] - [address] [place] [address] [facility] - [place], [place] - [address] [facility]    Clinical concerns: Pt is concerned with her weight loss and feeling more tired.    5 minutes for nursing intake (face to face time)     Torri Amanda MA                "

## 2019-11-21 NOTE — PATIENT INSTRUCTIONS
"- Today:  CT scan need to be completed.    CT Scan Date/Time: 11/22/19 check in at 1 pm for a 1:30pm scan.  Please see \"Getting Ready for Your CT Scan\" for details.  Nothing to eat or drink for 2 hours prior to scan.  You will check in 30 minutes early to start a \"water prep\" prior to scan instead of oral contrast.  Radiology will call you with different instructions if oral contrast needed.    - Please follow up with Dr. Perez in 1 year unless other wise directed.  Please schedule lab work 3-5 days prior to follow up appointment.    Lab Date/Time:    Office visit follow up with Dr. Perez Date/Time:    If you have any questions or concerns please feel free to call.    If you need to reschedule please call:  Clinic or Lab Appointment - [phone]  Infusion - [phone]  Imaging - [phone]      [facility] Oncology/Hematology Care - [place] Team        "

## 2019-11-22 ENCOUNTER — HOSPITAL ENCOUNTER (OUTPATIENT)
Dept: CT IMAGING | Facility: CLINIC | Age: 79
Discharge: HOME | End: 2019-11-22
Attending: INTERNAL MEDICINE | Admitting: INTERNAL MEDICINE
Payer: MEDICARE

## 2019-11-22 DIAGNOSIS — C83.30 DIFFUSE LARGE B-CELL LYMPHOMA, UNSPECIFIED BODY REGION (H): ICD-10-CM

## 2019-11-22 PROCEDURE — 25000125 ZZHC RX 250: Performed by: RADIOLOGY

## 2019-11-22 PROCEDURE — 25000128 H RX IP 250 OP 636: Performed by: RADIOLOGY

## 2019-11-22 PROCEDURE — 71260 CT THORAX DX C+: CPT

## 2019-11-22 PROCEDURE — 74177 CT ABD & PELVIS W/CONTRAST: CPT

## 2019-11-22 RX ORDER — IOPAMIDOL 755 MG/ML
500 INJECTION, SOLUTION INTRAVASCULAR ONCE
Status: COMPLETED | OUTPATIENT
Start: 2019-11-22 | End: 2019-11-22

## 2019-11-22 RX ADMIN — SODIUM CHLORIDE 60 ML: 9 INJECTION, SOLUTION INTRAVENOUS at 14:05

## 2019-11-22 RX ADMIN — IOPAMIDOL 50 ML: 755 INJECTION, SOLUTION INTRAVENOUS at 14:05

## 2019-11-27 ENCOUNTER — TELEPHONE (OUTPATIENT)
Dept: ONCOLOGY | Facility: CLINIC | Age: 79
End: 2019-11-27

## 2019-11-27 ENCOUNTER — TELEPHONE (OUTPATIENT)
Dept: INTERNAL MEDICINE | Facility: CLINIC | Age: 79
End: 2019-11-27

## 2019-11-27 NOTE — TELEPHONE ENCOUNTER
----- Message from Brian Roman MD sent at 11/27/2019  1:06 PM CST -----  Regarding: FW: Colonoscopy  Please help her get an appt with us to discuss recent CT scan   ----- Message -----  From: Katya Rice RN  Sent: 11/27/2019  12:07 PM CST  To: Brian Roman MD  Subject: Colonoscopy                                      Hello Dr. Roman,  This patient is needing a colonoscopy and follow up appointment with you as PCP. Dr. Perez sees her once yearly and was hoping you could place an order so she may be scheduled. Please refer to encounter date 11/22/2019 note regarding CT of Chest/Abdomen/Pelvis. Let me know if you need anything else. I will follow up on Monday to see she is scheduled. Thanks.   Katya Rice RN on 11/27/2019 at 12:13 PM

## 2019-11-27 NOTE — TELEPHONE ENCOUNTER
----- Message from Wayne Perez MD sent at 11/26/2019  4:09 PM CST -----  Please inform the patient.  Arrange for colonoscopy and follow-up with his primary care physician

## 2019-12-06 ENCOUNTER — OFFICE VISIT (OUTPATIENT)
Dept: INTERNAL MEDICINE | Facility: CLINIC | Age: 79
End: 2019-12-06
Payer: MEDICARE

## 2019-12-06 VITALS
WEIGHT: 84 LBS | DIASTOLIC BLOOD PRESSURE: 68 MMHG | TEMPERATURE: 97.8 F | HEART RATE: 78 BPM | OXYGEN SATURATION: 98 % | BODY MASS INDEX: 16.97 KG/M2 | RESPIRATION RATE: 16 BRPM | SYSTOLIC BLOOD PRESSURE: 118 MMHG

## 2019-12-06 DIAGNOSIS — R63.4 WEIGHT LOSS: ICD-10-CM

## 2019-12-06 DIAGNOSIS — C83.30 DIFFUSE LARGE B-CELL LYMPHOMA, UNSPECIFIED BODY REGION (H): ICD-10-CM

## 2019-12-06 DIAGNOSIS — K52.9 COLITIS: Primary | ICD-10-CM

## 2019-12-06 DIAGNOSIS — R19.7 DIARRHEA, UNSPECIFIED TYPE: ICD-10-CM

## 2019-12-06 PROCEDURE — 99214 OFFICE O/P EST MOD 30 MIN: CPT | Performed by: INTERNAL MEDICINE

## 2019-12-06 ASSESSMENT — PAIN SCALES - GENERAL: PAINLEVEL: NO PAIN (0)

## 2019-12-06 NOTE — PROGRESS NOTES
Subjective     Lilli Russo is a 79 year old female who presents to clinic today for the following health issues:    HPI   Chief Complaint   Patient presents with     CT Results     go over 11/22/19 ct report     She was not feeling good, tired, diarrhea, weight loss. Feeling better today.      Saw oncology, no lymphoma recurrence, did a ct scan due to weight loss.     CT showed thickening in the descending and sigmoid colon.  No diarrhea now, better for the last week. She had a little blood in the stool.  No abd pain, eating ok, much improved.        Past Medical History:   Diagnosis Date     Cancer (H) 5/2014    lymphoma, large b cell, stem cell transplant     Macular degeneration (senile) of retina, unspecified      Osteoporosis, unspecified      Pure hypercholesterolemia      Unspecified closed fracture of pelvis 01/12/09     Unspecified hypothyroidism        Current Outpatient Medications   Medication     acetaminophen (TYLENOL) 325 MG tablet     Calcium Carb-Cholecalciferol (CALCIUM 600 + D PO)     calcium carbonate (TUMS) 500 MG chewable tablet     Cholecalciferol (VITAMIN D-3 PO)     Flaxseed, Linseed, (FLAX SEED OIL) 1000 MG capsule     levothyroxine (SYNTHROID/LEVOTHROID) 50 MCG tablet     MULTIVITAMIN TABS   OR     No current facility-administered medications for this visit.        Social History     Tobacco Use     Smoking status: Never Smoker     Smokeless tobacco: Never Used   Substance Use Topics     Alcohol use: No     Alcohol/week: 0.0 standard drinks     Drug use: No       Review of Systems  Constitutional-Weight loss.  ENT-No earpain, sore throat, voice changes or rhinitis.  Cardiac-No chest pain or palpitations.  Respiratory-No cough, sob, or hemoptysis.  GI-Diarrhea. Is now better, eating better.     Physical Exam  Vitals: /68   Pulse 78   Temp 97.8  F (36.6  C) (Temporal)   Resp 16   Wt 38.1 kg (84 lb)   SpO2 98%   BMI 16.97 kg/m    BMI= Body mass index is 16.97 kg/m .    General  Appearance-healthy, alert, no distress, thin   Cardiac-regular rate and rhythm  with normal S1, S2 ; no murmur, rub or gallops  Lungs-clear to auscultation  GI-Soft, nontender.  Normal bowel sounds.  No hepatosplenomegaly or abnormal masses    ASSESSMENT:  This is a 79-year-old woman who has a history of lymphoma.  She just saw oncology November 22 and was doing well labs were okay.  Unfortunately she had lost another 10 pounds.  She says she is having a lot of stress at home her  has cancer prostate and rectal cancer.  Due to the weight loss her oncologist ordered a CAT scan which showed thickening of her colon in the descending and sigmoid.  She was having some nausea, diarrhea and not feeling well.  This is cleared up.  She did not want to do a colonoscopy due to the stress of that.  I am worried about possible bowel rupture if there is truly inflammation there.  The question is whether this infectious, ischemic she had no pain I do not think it was ischemic.  I would like her to repeat the CAT scan in 2 weeks to a weight check in 2 weeks and then will reconsider doing a colonoscopy but most likely this was some sort of colitis or diverticulitis that is resolved she is better she is eating she does not need antibiotics at this time.    Electronically signed by Brian Roman MD

## 2019-12-17 ENCOUNTER — HOSPITAL ENCOUNTER (OUTPATIENT)
Dept: CT IMAGING | Facility: CLINIC | Age: 79
Discharge: HOME | End: 2019-12-17
Attending: INTERNAL MEDICINE | Admitting: INTERNAL MEDICINE
Payer: MEDICARE

## 2019-12-17 ENCOUNTER — OFFICE VISIT (OUTPATIENT)
Dept: INTERNAL MEDICINE | Facility: CLINIC | Age: 79
End: 2019-12-17
Payer: MEDICARE

## 2019-12-17 VITALS
HEART RATE: 76 BPM | DIASTOLIC BLOOD PRESSURE: 70 MMHG | SYSTOLIC BLOOD PRESSURE: 112 MMHG | RESPIRATION RATE: 16 BRPM | BODY MASS INDEX: 17.01 KG/M2 | WEIGHT: 84.2 LBS | TEMPERATURE: 97.2 F | OXYGEN SATURATION: 99 %

## 2019-12-17 DIAGNOSIS — K52.9 COLITIS: ICD-10-CM

## 2019-12-17 DIAGNOSIS — R63.4 WEIGHT LOSS: ICD-10-CM

## 2019-12-17 DIAGNOSIS — C83.30 DIFFUSE LARGE B-CELL LYMPHOMA, UNSPECIFIED BODY REGION (H): ICD-10-CM

## 2019-12-17 DIAGNOSIS — R63.4 WEIGHT LOSS: Primary | ICD-10-CM

## 2019-12-17 DIAGNOSIS — R19.7 DIARRHEA, UNSPECIFIED TYPE: ICD-10-CM

## 2019-12-17 PROCEDURE — 25000125 ZZHC RX 250: Performed by: RADIOLOGY

## 2019-12-17 PROCEDURE — 74177 CT ABD & PELVIS W/CONTRAST: CPT

## 2019-12-17 PROCEDURE — 99213 OFFICE O/P EST LOW 20 MIN: CPT | Performed by: INTERNAL MEDICINE

## 2019-12-17 PROCEDURE — 25000128 H RX IP 250 OP 636: Performed by: RADIOLOGY

## 2019-12-17 RX ORDER — IOPAMIDOL 755 MG/ML
500 INJECTION, SOLUTION INTRAVASCULAR ONCE
Status: COMPLETED | OUTPATIENT
Start: 2019-12-17 | End: 2019-12-17

## 2019-12-17 RX ADMIN — IOPAMIDOL 50 ML: 755 INJECTION, SOLUTION INTRAVENOUS at 13:18

## 2019-12-17 RX ADMIN — SODIUM CHLORIDE 60 ML: 9 INJECTION, SOLUTION INTRAVENOUS at 13:18

## 2019-12-17 ASSESSMENT — PAIN SCALES - GENERAL: PAINLEVEL: NO PAIN (0)

## 2019-12-17 NOTE — PROGRESS NOTES
Subjective     iLlli Russo is a 79 year old female who presents to clinic today for the following health issues:    HPI   Chief Complaint   Patient presents with     CT Results     Patient here for results of her CT scan.  She had it today.    She has had some diarrhea.  She had weight loss we saw her 10 days ago CAT scan showed some thickening of her colon.  But she was getting better.  Her bowel movements are back to normal she has no abdominal pain.  She still has a lot of stress from her  having rectal cancer and some other issues at home.  But she is eating at this point.    Past Medical History:   Diagnosis Date     Cancer (H) 5/2014    lymphoma, large b cell, stem cell transplant     Macular degeneration (senile) of retina, unspecified      Osteoporosis, unspecified      Pure hypercholesterolemia      Unspecified closed fracture of pelvis 01/12/09     Unspecified hypothyroidism      Physical Exam  /70   Pulse 76   Temp 97.2  F (36.2  C) (Temporal)   Resp 16   Wt 38.2 kg (84 lb 3.2 oz)   SpO2 99%   BMI 17.01 kg/m    General Appearance-healthy, alert, no distress  Abdomen 1 small area of pain otherwise nontender without any masses throughout.    ASSESSMENT:  79-year-old woman who has a history of B-cell and thickening on her sigmoid and descending colon on the CT scan 2 weeks ago.  She had some weight loss and diarrhea.  I think she had an infection probably diverticulitis this appears to be resolving her bowel movements are back to normal her weight has stabilized.  She did not want to do a colonoscopy due to the fear of rupture.  I reviewed her CT scan with radiology and the thickening is gone except for one small spot.  We will see her back in clinic for a weight check in 3 weeks.  If her weight is going up and she is feeling better will not do anything further if her weight goes down some more we will then have to consider a colonoscopy again.  Patient agrees with this  plan.    Electronically signed by Brian Roman MD

## 2020-01-07 ENCOUNTER — HOSPITAL ENCOUNTER (OUTPATIENT)
Dept: MAMMOGRAPHY | Facility: CLINIC | Age: 80
Discharge: HOME | End: 2020-01-07
Attending: INTERNAL MEDICINE | Admitting: INTERNAL MEDICINE
Payer: MEDICARE

## 2020-01-07 ENCOUNTER — TELEPHONE (OUTPATIENT)
Dept: INTERNAL MEDICINE | Facility: CLINIC | Age: 80
End: 2020-01-07

## 2020-01-07 ENCOUNTER — ALLIED HEALTH/NURSE VISIT (OUTPATIENT)
Dept: FAMILY MEDICINE | Facility: CLINIC | Age: 80
End: 2020-01-07
Payer: MEDICARE

## 2020-01-07 VITALS — BODY MASS INDEX: 17.18 KG/M2 | HEIGHT: 59 IN | WEIGHT: 85.2 LBS

## 2020-01-07 DIAGNOSIS — Z12.31 VISIT FOR SCREENING MAMMOGRAM: ICD-10-CM

## 2020-01-07 DIAGNOSIS — R63.4 WEIGHT LOSS: Primary | ICD-10-CM

## 2020-01-07 PROCEDURE — 99207 ZZC NO CHARGE NURSE ONLY: CPT

## 2020-01-07 PROCEDURE — 77067 SCR MAMMO BI INCL CAD: CPT

## 2020-01-07 ASSESSMENT — PAIN SCALES - GENERAL: PAINLEVEL: NO PAIN (0)

## 2020-01-07 ASSESSMENT — MIFFLIN-ST. JEOR: SCORE: 763.12

## 2020-01-07 NOTE — TELEPHONE ENCOUNTER
Patient was in for a weight check on 1/7/2020. She weighed 85.2 lbs.    Patient is wondering do you want her to come in for any more weight checks?    Jace Drake, CMA

## 2020-01-08 NOTE — TELEPHONE ENCOUNTER
Patient called on 1/7/2020 and had scheduled an appointment.    Jace Drake, [facility]

## 2020-01-28 ENCOUNTER — ALLIED HEALTH/NURSE VISIT (OUTPATIENT)
Dept: FAMILY MEDICINE | Facility: CLINIC | Age: 80
End: 2020-01-28
Payer: MEDICARE

## 2020-01-28 VITALS — WEIGHT: 87 LBS | TEMPERATURE: 96.8 F | BODY MASS INDEX: 17.72 KG/M2

## 2020-01-28 DIAGNOSIS — R63.4 WEIGHT LOSS: Primary | ICD-10-CM

## 2020-01-28 PROCEDURE — 99207 ZZC NO CHARGE NURSE ONLY: CPT

## 2020-01-28 NOTE — PROGRESS NOTES
Patient presented to clinic today for a weight check.   Previously recorded weight was 85lbs on 01/07/2020   Today 01/28/2020, weight was recorded at 87 lbs    Patient was in good spirits and had no further questions.   Hilda Dugan CMA

## 2020-06-15 ENCOUNTER — TELEPHONE (OUTPATIENT)
Dept: INTERNAL MEDICINE | Facility: CLINIC | Age: 80
End: 2020-06-15

## 2020-06-15 ENCOUNTER — OFFICE VISIT (OUTPATIENT)
Dept: INTERNAL MEDICINE | Facility: CLINIC | Age: 80
End: 2020-06-15
Payer: MEDICARE

## 2020-06-15 VITALS
OXYGEN SATURATION: 98 % | WEIGHT: 83 LBS | HEART RATE: 86 BPM | DIASTOLIC BLOOD PRESSURE: 70 MMHG | RESPIRATION RATE: 18 BRPM | TEMPERATURE: 98.2 F | BODY MASS INDEX: 17.42 KG/M2 | SYSTOLIC BLOOD PRESSURE: 118 MMHG | HEIGHT: 58 IN

## 2020-06-15 DIAGNOSIS — C83.30 DIFFUSE LARGE B-CELL LYMPHOMA, UNSPECIFIED BODY REGION (H): ICD-10-CM

## 2020-06-15 DIAGNOSIS — Z00.00 ENCOUNTER FOR MEDICARE ANNUAL WELLNESS EXAM: Primary | ICD-10-CM

## 2020-06-15 DIAGNOSIS — E89.0 POSTOPERATIVE HYPOTHYROIDISM: ICD-10-CM

## 2020-06-15 DIAGNOSIS — R63.4 WEIGHT LOSS: ICD-10-CM

## 2020-06-15 LAB
ALBUMIN SERPL-MCNC: 3.8 G/DL (ref 3.4–5)
ALP SERPL-CCNC: 52 U/L (ref 40–150)
ALT SERPL W P-5'-P-CCNC: 19 U/L (ref 0–50)
ANION GAP SERPL CALCULATED.3IONS-SCNC: 3 MMOL/L (ref 3–14)
AST SERPL W P-5'-P-CCNC: 22 U/L (ref 0–45)
BILIRUB SERPL-MCNC: 0.4 MG/DL (ref 0.2–1.3)
BUN SERPL-MCNC: 16 MG/DL (ref 7–30)
CALCIUM SERPL-MCNC: 8.7 MG/DL (ref 8.5–10.1)
CHLORIDE SERPL-SCNC: 106 MMOL/L (ref 94–109)
CO2 SERPL-SCNC: 33 MMOL/L (ref 20–32)
CREAT SERPL-MCNC: 0.75 MG/DL (ref 0.52–1.04)
ERYTHROCYTE [DISTWIDTH] IN BLOOD BY AUTOMATED COUNT: 12.1 % (ref 10–15)
GFR SERPL CREATININE-BSD FRML MDRD: 76 ML/MIN/{1.73_M2}
GLUCOSE SERPL-MCNC: 122 MG/DL (ref 70–99)
HCT VFR BLD AUTO: 39 % (ref 35–47)
HGB BLD-MCNC: 13.1 G/DL (ref 11.7–15.7)
MCH RBC QN AUTO: 33.4 PG (ref 26.5–33)
MCHC RBC AUTO-ENTMCNC: 33.6 G/DL (ref 31.5–36.5)
MCV RBC AUTO: 100 FL (ref 78–100)
PLATELET # BLD AUTO: 153 10E9/L (ref 150–450)
POTASSIUM SERPL-SCNC: 3.8 MMOL/L (ref 3.4–5.3)
PROT SERPL-MCNC: 7.1 G/DL (ref 6.8–8.8)
RBC # BLD AUTO: 3.92 10E12/L (ref 3.8–5.2)
SODIUM SERPL-SCNC: 142 MMOL/L (ref 133–144)
TSH SERPL DL<=0.005 MIU/L-ACNC: 2.75 MU/L (ref 0.4–4)
WBC # BLD AUTO: 5.3 10E9/L (ref 4–11)

## 2020-06-15 PROCEDURE — 36415 COLL VENOUS BLD VENIPUNCTURE: CPT | Performed by: INTERNAL MEDICINE

## 2020-06-15 PROCEDURE — 84443 ASSAY THYROID STIM HORMONE: CPT | Performed by: INTERNAL MEDICINE

## 2020-06-15 PROCEDURE — 99213 OFFICE O/P EST LOW 20 MIN: CPT | Mod: 25 | Performed by: INTERNAL MEDICINE

## 2020-06-15 PROCEDURE — 85027 COMPLETE CBC AUTOMATED: CPT | Performed by: INTERNAL MEDICINE

## 2020-06-15 PROCEDURE — G0439 PPPS, SUBSEQ VISIT: HCPCS | Performed by: INTERNAL MEDICINE

## 2020-06-15 PROCEDURE — 80053 COMPREHEN METABOLIC PANEL: CPT | Performed by: INTERNAL MEDICINE

## 2020-06-15 RX ORDER — LEVOTHYROXINE SODIUM 50 UG/1
50 TABLET ORAL DAILY
Qty: 90 TABLET | Refills: 3 | Status: SHIPPED | OUTPATIENT
Start: 2020-06-15 | End: 2021-06-21

## 2020-06-15 ASSESSMENT — MIFFLIN-ST. JEOR: SCORE: 741.24

## 2020-06-15 ASSESSMENT — PAIN SCALES - GENERAL: PAINLEVEL: NO PAIN (0)

## 2020-06-15 NOTE — PROGRESS NOTES
"SUBJECTIVE:   Lilli Russo is a 79 year old female who presents for Preventive Visit.  Are you in the first 12 months of your Medicare Part B coverage?  No    Physical Health:    In general, how would you rate your overall physical health? good    Outside of work, how many days during the week do you exercise? 6-7 days/week    Outside of work, approximately how many minutes a day do you exercise?greater than 60 minutes    If you drink alcohol do you typically have >3 drinks per day or >7 drinks per week? No    Do you usually eat at least 4 servings of fruit and vegetables a day, include whole grains & fiber and avoid regularly eating high fat or \"junk\" foods? Yes    Do you have any problems taking medications regularly?  No    Do you have any side effects from medications? not applicable    Needs assistance for the following daily activities: transportation and no assistance needed    Which of the following safety concerns are present in your home?  none identified     Hearing impairment: No    In the past 6 months, have you been bothered by leaking of urine? no    Mental Health:    In general, how would you rate your overall mental or emotional health? good  PHQ-2 Score:      Weight is down some, when she eat feels bloated. Has some abdominal pains and constipated or feels she isn't having enough of a bowel movement.  Last winter CT scan showed thickening but she doesn't a colonoscopy, worried about perforation.  Ok with repeat abd ct scan.     Opthamology has her on flax seed oil for her dry eyes.            Do you feel safe in your environment? Yes    Have you ever done Advance Care Planning? (For example, a Health Directive, POLST, or a discussion with a medical provider or your loved ones about your wishes): No, advance care planning information given to patient to review.  Patient declined advance care planning discussion at this time.    Additional concerns to address?  No    Fall risk:  Fallen 2 or more " times in the past year?: No  Any fall with injury in the past year?: No    Cognitive Screenin) Repeat 3 items (Leader, Season, Table)    2) Clock draw: NORMAL  3) 3 item recall: Recalls 3 objects  Results: 3 items recalled: COGNITIVE IMPAIRMENT LESS LIKELY    Mini-CogTM Copyright CRYSTAL Koehler. Licensed by the author for use in [facility]; reprinted with permission (lilli@[email or web address]). All rights reserved.      Do you have sleep apnea, excessive snoring or daytime drowsiness?: no      Reviewed and updated as needed this visit by clinical staff  Tobacco  Allergies  Meds  Med Hx  Surg Hx  Fam Hx  Soc Hx        Reviewed and updated as needed this visit by Provider        Social History     Tobacco Use     Smoking status: Never Smoker     Smokeless tobacco: Never Used   Substance Use Topics     Alcohol use: No     Alcohol/week: 0.0 standard drinks                           Current providers sharing in care for this patient include:   Patient Care Team:  Brian Roman MD as PCP - General (Internal Medicine)  Brian Roman MD as Assigned PCP  Parish Rodney, RN as Registered Nurse (Hematology & Oncology)    The following health maintenance items are reviewed in Epic and correct as of today:  Health Maintenance   Topic Date Due     ZOSTER IMMUNIZATION (1 of 2) 1990     PNEUMOCOCCAL IMMUNIZATION 65+ HIGH/HIGHEST RISK (2 of 2 - PPSV23) 2016     PHQ-2  2020     LIPID  2020     FALL RISK ASSESSMENT  2020     MEDICARE ANNUAL WELLNESS VISIT  2020     TSH W/FREE T4 REFLEX  2020     ADVANCE CARE PLANNING  2021     DTAP/TDAP/TD IMMUNIZATION (4 - Td) 2026     DEXA  Completed     INFLUENZA VACCINE  Completed     IPV IMMUNIZATION  Aged Out     MENINGITIS IMMUNIZATION  Aged Out     Lab work is in process  Pneumonia Vaccine:2015, , 2016 doesn't want the pneumonia 23 shot.   Mammogram Screening: 2020    ROS:  CONSTITUTIONAL:POSITIVE  for weight  "loss  INTEGUMENTARY/SKIN: NEGATIVE for worrisome rashes, moles or lesions  EYES: NEGATIVE for vision changes or irritation  ENT/MOUTH: NEGATIVE for ear, mouth and throat problems  RESP: NEGATIVE for significant cough or SOB  BREAST: NEGATIVE for masses, tenderness or discharge  CV: NEGATIVE for chest pain, palpitations or peripheral edema  GI: POSITIVE for abdominal pain generalized and constipation  : NEGATIVE for frequency, dysuria, or hematuria  MUSCULOSKELETAL: NEGATIVE for significant arthralgias or myalgia  NEURO: NEGATIVE for weakness, dizziness or paresthesias  ENDOCRINE: NEGATIVE for temperature intolerance, skin/hair changes  HEME: NEGATIVE for bleeding problems  PSYCHIATRIC: NEGATIVE for changes in mood or affect    OBJECTIVE:   /70   Pulse 86   Temp 98.2  F (36.8  C) (Temporal)   Resp 18   Ht 1.473 m (4' 10\")   Wt 37.6 kg (83 lb)   SpO2 98%   Breastfeeding No   BMI 17.35 kg/m   Estimated body mass index is 17.35 kg/m  as calculated from the following:    Height as of this encounter: 1.473 m (4' 10\").    Weight as of this encounter: 37.6 kg (83 lb).  EXAM:   GENERAL APPEARANCE: healthy, alert and no distress  GENERAL APPEARANCE: very thin  EYES: Eyes grossly normal to inspection, PERRL and conjunctivae and sclerae normal  HENT: ear canals and TM's normal, nose and mouth without ulcers or lesions, oropharynx clear and oral mucous membranes moist  NECK: no adenopathy, no asymmetry, masses, or scars and thyroid normal to palpation  RESP: lungs clear to auscultation - no rales, rhonchi or wheezes  CV: regular rate and rhythm, normal S1 S2, no S3 or S4, no murmur, click or rub, no peripheral edema and peripheral pulses strong  ABDOMEN: soft, nontender, no hepatosplenomegaly, no masses and bowel sounds normal  MS: no musculoskeletal defects are noted and gait is age appropriate without ataxia  SKIN: no suspicious lesions or rashes  NEURO: Normal strength and tone, sensory exam grossly normal, " "mentation intact and speech normal  PSYCH: mentation appears normal and affect normal/bright    Diagnostic Test Results:  Labs reviewed in Epic    ASSESSMENT / PLAN:       ICD-10-CM    1. Encounter for Medicare annual wellness exam  Z00.00 Comprehensive metabolic panel     CBC with platelets   2. Postoperative hypothyroidism  E89.0 levothyroxine (SYNTHROID/LEVOTHROID) 50 MCG tablet   3. Weight loss  R63.4 TSH with free T4 reflex     Comprehensive metabolic panel     CBC with platelets   4. Diffuse large B-cell lymphoma, unspecified body region (H)  C83.30      Concern for the patient's weight loss, and generalized abdominal pains.  We are going to get an abdominal CT.  We will check labs today.  She will also stop the flaxseed oil as this seems to have bothered her.    Hypothyroidism we will refill her Synthroid and check a TSH today.    Recommended immunizations but she is not interested in those at this time possibly a third covered pandemic.    COUNSELING:  Reviewed preventive health counseling, as reflected in patient instructions       Regular exercise       Healthy diet/nutrition       Immunizations    Declined: Pneumococcal due to Other COVID            Estimated body mass index is 17.35 kg/m  as calculated from the following:    Height as of this encounter: 1.473 m (4' 10\").    Weight as of this encounter: 37.6 kg (83 lb).    Weight management plan noted, stable and monitoring and CT scan of the abdomen      reports that she has never smoked. She has never used smokeless tobacco.      Appropriate preventive services were discussed with this patient, including applicable screening as appropriate for cardiovascular disease, diabetes, osteopenia/osteoporosis, and glaucoma.  As appropriate for age/gender, discussed screening for colorectal cancer, prostate cancer, breast cancer, and cervical cancer. Checklist reviewing preventive services available has been given to the patient.    Reviewed patients plan of care " and provided an AVS. The Basic Care Plan (routine screening as documented in Health Maintenance) for Lilli meets the Care Plan requirement. This Care Plan has been established and reviewed with the Patient.    Counseling Resources:  ATP IV Guidelines  Pooled Cohorts Equation Calculator  Breast Cancer Risk Calculator  FRAX Risk Assessment  ICSI Preventive Guidelines  Dietary Guidelines for Americans, 2010  USDA's MyPlate  ASA Prophylaxis  Lung CA Screening    Brian Roman MD  [facility]

## 2020-06-15 NOTE — TELEPHONE ENCOUNTER
----- Message from Brian Roman MD sent at 6/15/2020  2:55 PM CDT -----  Please call and let her know that her labs are good, thyroid is normal at this point.  Liver kidneys are stable.  And her blood count is normal.

## 2020-06-15 NOTE — PATIENT INSTRUCTIONS
Patient Education   Personalized Prevention Plan  You are due for the preventive services outlined below.  Your care team is available to assist you in scheduling these services.  If you have already completed any of these items, please share that information with your care team to update in your medical record.  Health Maintenance Due   Topic Date Due     Zoster (Shingles) Vaccine (1 of 2) 06/17/1990     Pneumococcal Vaccine (2 of 2 - PPSV23) 07/07/2016     Cholesterol Lab  06/01/2020     FALL RISK ASSESSMENT  06/12/2020     Thyroid Function Lab  06/12/2020

## 2020-06-16 ENCOUNTER — HOSPITAL ENCOUNTER (OUTPATIENT)
Dept: CT IMAGING | Facility: CLINIC | Age: 80
Discharge: HOME | End: 2020-06-16
Attending: INTERNAL MEDICINE | Admitting: INTERNAL MEDICINE
Payer: MEDICARE

## 2020-06-16 DIAGNOSIS — R63.4 WEIGHT LOSS: ICD-10-CM

## 2020-06-16 PROCEDURE — 25000125 ZZHC RX 250: Performed by: RADIOLOGY

## 2020-06-16 PROCEDURE — 25000128 H RX IP 250 OP 636: Performed by: RADIOLOGY

## 2020-06-16 PROCEDURE — 74177 CT ABD & PELVIS W/CONTRAST: CPT

## 2020-06-16 RX ORDER — IOPAMIDOL 755 MG/ML
500 INJECTION, SOLUTION INTRAVASCULAR ONCE
Status: COMPLETED | OUTPATIENT
Start: 2020-06-16 | End: 2020-06-16

## 2020-06-16 RX ADMIN — IOPAMIDOL 50 ML: 755 INJECTION, SOLUTION INTRAVENOUS at 14:22

## 2020-06-16 RX ADMIN — SODIUM CHLORIDE 60 ML: 9 INJECTION, SOLUTION INTRAVENOUS at 14:22

## 2020-06-17 ENCOUNTER — TELEPHONE (OUTPATIENT)
Dept: INTERNAL MEDICINE | Facility: CLINIC | Age: 80
End: 2020-06-17

## 2020-06-17 NOTE — TELEPHONE ENCOUNTER
Spoke with patient and informed of results below. Patient understood. No further questions or concerns at this time.     Yumiko Salgado MA

## 2020-06-17 NOTE — TELEPHONE ENCOUNTER
----- Message from Brian Roman MD sent at 6/17/2020  8:55 AM CDT -----  Please call and let her know that her CT scan was okay.  There is no sign of tumors or masses.  No cause of her weight loss.  Continue to work on eating and maintaining her weight.

## 2020-11-20 DIAGNOSIS — C83.30 DIFFUSE LARGE B-CELL LYMPHOMA, UNSPECIFIED BODY REGION (H): ICD-10-CM

## 2020-11-20 LAB
ALBUMIN SERPL-MCNC: 3.8 G/DL (ref 3.4–5)
ALP SERPL-CCNC: 49 U/L (ref 40–150)
ALT SERPL W P-5'-P-CCNC: 18 U/L (ref 0–50)
ANION GAP SERPL CALCULATED.3IONS-SCNC: 5 MMOL/L (ref 3–14)
AST SERPL W P-5'-P-CCNC: 19 U/L (ref 0–45)
BASOPHILS # BLD AUTO: 0 10E9/L (ref 0–0.2)
BASOPHILS NFR BLD AUTO: 0.2 %
BILIRUB SERPL-MCNC: 0.6 MG/DL (ref 0.2–1.3)
BUN SERPL-MCNC: 14 MG/DL (ref 7–30)
CALCIUM SERPL-MCNC: 9.2 MG/DL (ref 8.5–10.1)
CHLORIDE SERPL-SCNC: 106 MMOL/L (ref 94–109)
CO2 SERPL-SCNC: 30 MMOL/L (ref 20–32)
CREAT SERPL-MCNC: 0.81 MG/DL (ref 0.52–1.04)
DIFFERENTIAL METHOD BLD: ABNORMAL
EOSINOPHIL NFR BLD AUTO: 0.9 %
ERYTHROCYTE [DISTWIDTH] IN BLOOD BY AUTOMATED COUNT: 12.5 % (ref 10–15)
GFR SERPL CREATININE-BSD FRML MDRD: 69 ML/MIN/{1.73_M2}
GLUCOSE SERPL-MCNC: 90 MG/DL (ref 70–99)
HCT VFR BLD AUTO: 40.2 % (ref 35–47)
HGB BLD-MCNC: 13.2 G/DL (ref 11.7–15.7)
IMM GRANULOCYTES # BLD: 0 10E9/L (ref 0–0.4)
IMM GRANULOCYTES NFR BLD: 0.2 %
LDH SERPL L TO P-CCNC: 181 U/L (ref 81–234)
LYMPHOCYTES # BLD AUTO: 1.4 10E9/L (ref 0.8–5.3)
LYMPHOCYTES NFR BLD AUTO: 31.6 %
MCH RBC QN AUTO: 33.1 PG (ref 26.5–33)
MCHC RBC AUTO-ENTMCNC: 32.8 G/DL (ref 31.5–36.5)
MCV RBC AUTO: 101 FL (ref 78–100)
MONOCYTES # BLD AUTO: 0.4 10E9/L (ref 0–1.3)
MONOCYTES NFR BLD AUTO: 8.4 %
NEUTROPHILS # BLD AUTO: 2.6 10E9/L (ref 1.6–8.3)
NEUTROPHILS NFR BLD AUTO: 58.7 %
NRBC # BLD AUTO: 0 10*3/UL
NRBC BLD AUTO-RTO: 0 /100
PLATELET # BLD AUTO: 147 10E9/L (ref 150–450)
POTASSIUM SERPL-SCNC: 4 MMOL/L (ref 3.4–5.3)
PROT SERPL-MCNC: 6.7 G/DL (ref 6.8–8.8)
RBC # BLD AUTO: 3.99 10E12/L (ref 3.8–5.2)
SODIUM SERPL-SCNC: 141 MMOL/L (ref 133–144)
WBC # BLD AUTO: 4.5 10E9/L (ref 4–11)

## 2020-11-20 PROCEDURE — 85025 COMPLETE CBC W/AUTO DIFF WBC: CPT | Performed by: INTERNAL MEDICINE

## 2020-11-20 PROCEDURE — 80053 COMPREHEN METABOLIC PANEL: CPT | Performed by: INTERNAL MEDICINE

## 2020-11-20 PROCEDURE — 36415 COLL VENOUS BLD VENIPUNCTURE: CPT | Performed by: INTERNAL MEDICINE

## 2020-11-20 PROCEDURE — 83615 LACTATE (LD) (LDH) ENZYME: CPT | Performed by: INTERNAL MEDICINE

## 2020-12-01 ENCOUNTER — VIRTUAL VISIT (OUTPATIENT)
Dept: ONCOLOGY | Facility: CLINIC | Age: 80
End: 2020-12-01
Payer: MEDICARE

## 2020-12-01 DIAGNOSIS — C83.30 DIFFUSE LARGE B-CELL LYMPHOMA, UNSPECIFIED BODY REGION (H): Primary | ICD-10-CM

## 2020-12-01 PROCEDURE — 99442 PR PHYSICIAN TELEPHONE EVALUATION 11-20 MIN: CPT | Performed by: INTERNAL MEDICINE

## 2020-12-01 NOTE — PATIENT INSTRUCTIONS
Follow-up in 1 year with blood work    Today:  Plan to follow up in 1 year.    Please follow up with Dr. Perez in 1 year.  Please schedule labs prior to follow up appointment.    Lab Date/Time: 12/2/21 @9:30am    Office visit follow up with Dr. Perez Date/Time: 12/2/21 @10:00am       If you have any questions or concerns please feel free to call.    If you need to reschedule please call:  Clinic or Lab Appointment - [phone]  Infusion - [phone]  Imaging - [phone]    Ria Lopez [facility]  [phone]

## 2020-12-01 NOTE — PROGRESS NOTES
DISCHARGE PLAN:  Next appointments: See patient instruction section  Telephone or virtual visit.    Lilli Russo had a telephone/Virtual visit for 1 year follo wup lymphoma.  Patient instructions completed per Dr. Maradiaga's post call instructions.  Follow up in 1 year. See patient instructions and Oncologist's Progress note for further details. Questions and concerns addressed to patient's satisfaction. Patient verbalized and demonstrated understanding of plan.  Contact information provided and patient is encouraged to call with any that arise in the interim of care.    Ria Lopez, [facility]  Oncology Care   [facility]  [phone]  12/1/2020, 4:03 PM

## 2020-12-01 NOTE — LETTER
"    12/1/2020         RE: Lilli Russo  [address]  [facility] [address]        Dear Colleague,    Thank you for referring your patient, Lilli Russo, to the [facility]. Please see a copy of my visit note below.    Lilli Russo is a 80 year old female who is being evaluated via a billable telephone visit.      The patient has been notified of following:     \"This telephone visit will be conducted via a call between you and your physician/provider. We have found that certain health care needs can be provided without the need for a physical exam.  This service lets us provide the care you need with a short phone conversation.  If a prescription is necessary we can send it directly to your pharmacy.  If lab work is needed we can place an order for that and you can then stop by our lab to have the test done at a later time.    Telephone visits are billed at different rates depending on your insurance coverage. During this emergency period, for some insurers they may be billed the same as an in-person visit.  Please reach out to your insurance provider with any questions.    If during the course of the call the physician/provider feels a telephone visit is not appropriate, you will not be charged for this service.\"    Patient has given verbal consent for Telephone visit?  Yes    What phone number would you like to be contacted at? [phone]        Oncology Rooming Note    December 1, 2020 2:11 PM   Lilli Russo is a 80 year old female who presents for:    Chief Complaint   Patient presents with     Oncology Clinic Visit     1 year follow up-Diffuse large B-cell lymphoma     Results     labs-11/20/20     Initial Vitals: There were no vitals taken for this visit. Estimated body mass index is 17.35 kg/m  as calculated from the following:    Height as of 6/15/20: 1.473 m (4' 10\").    Weight as of 6/15/20: 37.6 kg (83 lb). There is no height or weight on file to calculate BSA.  Data " Unavailable Comment: Data Unavailable   No LMP recorded. Patient is postmenopausal.  Allergies reviewed: Yes  Medications reviewed: Yes    Medications: Medication refills not needed today.  Pharmacy name entered into EPIC:    [facility] - [place], [place] - [address] [facility] - [place], [place] - [address] [facility]    Clinical concerns: None. Concerns reviewed with Dr. Chris Lopez, [facility]                Hematology/ Oncology Telephone Visit:  Dec 1, 2020    Due to the concerns around COVID-19 and adhering to social distancing we conducted this visit over the telephone.      Reason for Visit:   Chief Complaint   Patient presents with     Oncology Clinic Visit     1 year follow up-Diffuse large B-cell lymphoma     Results     labs-11/20/20       Oncologic History:    Diffuse large B cell lymphoma (H)  Lilli Russo is known with known history of Non Hodgkin's lymphoma since 2013 when she felt feverish, had chills, poor taste, fatigue, cough, not too much sputum production, indigestion, early satiety, night sweats.  She was found to have pancytopenia with white count 2.4, hemoglobin 10.2, MCV 92, platelets 74, absolute neutrophil count 0.9, diff not significant without premature cells..   CT chest, abdomen and pelvis 09/16/2013:  Mild splenomegaly.  Spleen measures 13.2 cm.  Further extensive infectious disease work up was negative. 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 She was seen by Dr. Fuller and is offered [facility]OP [id number]/26/2013 with IT prophylaxis 12 mg MTX  On 9/26 with negative LP and brain MRI. Her presenting LDH is high >900, she received 1 dose of Rasburicase on 9/28/2013. She had [facility]CHOP from 10/2013 to 1/2014. Repeat BM 11/26/2013 - negative for lymphoma by morphology and flow, cyto is nl.  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       Interval History:  Patient has  been feeling well.  She denies any recent weight loss or night sweats or fever or chills patient denies any nausea vomiting or diarrhea.  She denies any shortness of breath or cough or wheezing.    Review of systems:  Pertinent positives have been included in HPI; remainder of detailed complete 20-point ROS was negative.    Past medical, social, surgical, and family histories reviewed.    Allergies:  Allergies as of 12/01/2020 - Reviewed 12/01/2020   Allergen Reaction Noted     Ceftazidime Rash 09/24/2013       Current Medications:  Current Outpatient Medications   Medication Sig Dispense Refill     acetaminophen (TYLENOL) 325 MG tablet Take 2 tablets (650 mg) by mouth every 4 hours as needed for pain 100 tablet 0     Calcium Carb-Cholecalciferol (CALCIUM 600 + D PO) Take by mouth daily       calcium carbonate (TUMS) 500 MG chewable tablet Take 2 chew tab by mouth daily as needed       Cholecalciferol (VITAMIN D-3 PO) Take 1,000 Units by mouth daily.        levothyroxine (SYNTHROID/LEVOTHROID) 50 MCG tablet Take 1 tablet (50 mcg) by mouth daily 90 tablet 3     MULTIVITAMIN TABS   OR 1 TABLET DAILY       Flaxseed, Linseed, (FLAX SEED OIL) 1000 MG capsule Take 1 capsule by mouth 2 times daily          Laboratory/Imaging Studies:  Orders Only on 11/20/2020   Component Date Value Ref Range Status     Lactate Dehydrogenase 11/20/2020 181  81 - 234 U/L Final     Sodium 11/20/2020 141  133 - 144 mmol/L Final     Potassium 11/20/2020 4.0  3.4 - 5.3 mmol/L Final     Chloride 11/20/2020 106  94 - 109 mmol/L Final     Carbon Dioxide 11/20/2020 30  20 - 32 mmol/L Final     Anion Gap 11/20/2020 5  3 - 14 mmol/L Final     Glucose 11/20/2020 90  70 - 99 mg/dL Final     Urea Nitrogen 11/20/2020 14  7 - 30 mg/dL Final     Creatinine 11/20/2020 0.81  0.52 - 1.04 mg/dL Final     GFR Estimate 11/20/2020 69  >60 mL/min/[1.73_m2] Final    Comment: Non  GFR Calc  Starting 12/18/2018, serum creatinine based estimated GFR  (eGFR) will be   calculated using the Chronic Kidney Disease Epidemiology Collaboration   (CKD-EPI) equation.       GFR Estimate If Black 11/20/2020 80  >60 mL/min/[1.73_m2] Final    Comment:  GFR Calc  Starting 12/18/2018, serum creatinine based estimated GFR (eGFR) will be   calculated using the Chronic Kidney Disease Epidemiology Collaboration   (CKD-EPI) equation.       Calcium 11/20/2020 9.2  8.5 - 10.1 mg/dL Final     Bilirubin Total 11/20/2020 0.6  0.2 - 1.3 mg/dL Final     Albumin 11/20/2020 3.8  3.4 - 5.0 g/dL Final     Protein Total 11/20/2020 6.7* 6.8 - 8.8 g/dL Final     Alkaline Phosphatase 11/20/2020 49  40 - 150 U/L Final     ALT 11/20/2020 18  0 - 50 U/L Final     AST 11/20/2020 19  0 - 45 U/L Final     WBC 11/20/2020 4.5  4.0 - 11.0 10e9/L Final     RBC Count 11/20/2020 3.99  3.8 - 5.2 10e12/L Final     Hemoglobin 11/20/2020 13.2  11.7 - 15.7 g/dL Final     Hematocrit 11/20/2020 40.2  35.0 - 47.0 % Final     MCV 11/20/2020 101* 78 - 100 fl Final     MCH 11/20/2020 33.1* 26.5 - 33.0 pg Final     MCHC 11/20/2020 32.8  31.5 - 36.5 g/dL Final     RDW 11/20/2020 12.5  10.0 - 15.0 % Final     Platelet Count 11/20/2020 147* 150 - 450 10e9/L Final     Diff Method 11/20/2020 Automated Method   Final     % Neutrophils 11/20/2020 58.7  % Final     % Lymphocytes 11/20/2020 31.6  % Final     % Monocytes 11/20/2020 8.4  % Final     % Eosinophils 11/20/2020 0.9  % Final     % Basophils 11/20/2020 0.2  % Final     % Immature Granulocytes 11/20/2020 0.2  % Final     Nucleated RBCs 11/20/2020 0  0 /100 Final     Absolute Neutrophil 11/20/2020 2.6  1.6 - 8.3 10e9/L Final     Absolute Lymphocytes 11/20/2020 1.4  0.8 - 5.3 10e9/L Final     Absolute Monocytes 11/20/2020 0.4  0.0 - 1.3 10e9/L Final     Absolute Basophils 11/20/2020 0.0  0.0 - 0.2 10e9/L Final     Abs Immature Granulocytes 11/20/2020 0.0  0 - 0.4 10e9/L Final     Absolute Nucleated RBC 11/20/2020 0.0   Final        No results found for this  or any previous visit (from the past 744 hour(s)).    Assessment and plan:    (C83.30) Diffuse large B-cell lymphoma, unspecified body region (H)  (primary encounter diagnosis)  I reviewed with the patient today most recent laboratory test and imaging studies.  There is no clinical evidence of lymphoma recurrence.  We will continue monitoring the patient's symptoms.  I will see the patient again in 1 year time or sooner if there are new developments or concerns.    The patient is ready to learn, no apparent learning barriers were identified.  Diagnosis and treatment plans were explained to the patient. The patient expressed understanding of the content. The patient asked appropriate questions. The patient questions were answered to her satisfaction.    Telephone call lasted 15 minutes.    Wayne Perez MD    Chart documentation with Dragon Voice recognition Software. Although reviewed after completion, some words and grammatical errors may remain.                                                      Again, thank you for allowing me to participate in the care of your patient.        Sincerely,        Wayne Perez MD

## 2020-12-01 NOTE — ASSESSMENT & PLAN NOTE
Lilli Russo is known with known history of Non Hodgkin's lymphoma since 2013 when she felt feverish, had chills, poor taste, fatigue, cough, not too much sputum production, indigestion, early satiety, night sweats.  She was found to have pancytopenia with white count 2.4, hemoglobin 10.2, MCV 92, platelets 74, absolute neutrophil count 0.9, diff not significant without premature cells..   CT chest, abdomen and pelvis 09/16/2013:  Mild splenomegaly.  Spleen measures 13.2 cm.  Further extensive infectious disease work up was negative. 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 She was seen by Dr. Fuller and is offered RCHOP [id number]/26/2013 with IT prophylaxis 12 mg MTX  On 9/26 with negative LP and brain MRI. Her presenting LDH is high >900, she received 1 dose of Rasburicase on 9/28/2013. She had RCHOP from 10/2013 to 1/2014. Repeat BM 11/26/2013 - negative for lymphoma by morphology and flow, cyto is nl.  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

## 2020-12-01 NOTE — LETTER
"    12/1/2020         RE: Lilli Russo  [address]  [facility] [address]        Dear Colleague,    Thank you for referring your patient, Lilli Russo, to the [facility]. Please see a copy of my visit note below.    Lilli Russo is a 80 year old female who is being evaluated via a billable telephone visit.      The patient has been notified of following:     \"This telephone visit will be conducted via a call between you and your physician/provider. We have found that certain health care needs can be provided without the need for a physical exam.  This service lets us provide the care you need with a short phone conversation.  If a prescription is necessary we can send it directly to your pharmacy.  If lab work is needed we can place an order for that and you can then stop by our lab to have the test done at a later time.    Telephone visits are billed at different rates depending on your insurance coverage. During this emergency period, for some insurers they may be billed the same as an in-person visit.  Please reach out to your insurance provider with any questions.    If during the course of the call the physician/provider feels a telephone visit is not appropriate, you will not be charged for this service.\"    Patient has given verbal consent for Telephone visit?  Yes    What phone number would you like to be contacted at? [phone]        Oncology Rooming Note    December 1, 2020 2:11 PM   Lilli Russo is a 80 year old female who presents for:    Chief Complaint   Patient presents with     Oncology Clinic Visit     1 year follow up-Diffuse large B-cell lymphoma     Results     labs-11/20/20     Initial Vitals: There were no vitals taken for this visit. Estimated body mass index is 17.35 kg/m  as calculated from the following:    Height as of 6/15/20: 1.473 m (4' 10\").    Weight as of 6/15/20: 37.6 kg (83 lb). There is no height or weight on file to calculate BSA.  Data " Unavailable Comment: Data Unavailable   No LMP recorded. Patient is postmenopausal.  Allergies reviewed: Yes  Medications reviewed: Yes    Medications: Medication refills not needed today.  Pharmacy name entered into EPIC:    [facility] - [place], [place] - [address] [facility] - [place], [place] - [address] [facility]    Clinical concerns: None. Concerns reviewed with Dr. Chris Lopez, [facility]                Hematology/ Oncology Telephone Visit:  Dec 1, 2020    Due to the concerns around COVID-19 and adhering to social distancing we conducted this visit over the telephone.      Reason for Visit:   Chief Complaint   Patient presents with     Oncology Clinic Visit     1 year follow up-Diffuse large B-cell lymphoma     Results     labs-11/20/20       Oncologic History:    Diffuse large B cell lymphoma (H)  Lilli Russo is known with known history of Non Hodgkin's lymphoma since 2013 when she felt feverish, had chills, poor taste, fatigue, cough, not too much sputum production, indigestion, early satiety, night sweats.  She was found to have pancytopenia with white count 2.4, hemoglobin 10.2, MCV 92, platelets 74, absolute neutrophil count 0.9, diff not significant without premature cells..   CT chest, abdomen and pelvis 09/16/2013:  Mild splenomegaly.  Spleen measures 13.2 cm.  Further extensive infectious disease work up was negative. 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 She was seen by Dr. Fuller and is offered RCHOP [id number]/26/2013 with IT prophylaxis 12 mg MTX  On 9/26 with negative LP and brain MRI. Her presenting LDH is high >900, she received 1 dose of Rasburicase on 9/28/2013. She had RCHOP from 10/2013 to 1/2014. Repeat BM 11/26/2013 - negative for lymphoma by morphology and flow, cyto is nl.  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       Interval History:  Patient has  been feeling well.  She denies any recent weight loss or night sweats or fever or chills patient denies any nausea vomiting or diarrhea.  She denies any shortness of breath or cough or wheezing.    Review of systems:  Pertinent positives have been included in HPI; remainder of detailed complete 20-point ROS was negative.    Past medical, social, surgical, and family histories reviewed.    Allergies:  Allergies as of 12/01/2020 - Reviewed 12/01/2020   Allergen Reaction Noted     Ceftazidime Rash 09/24/2013       Current Medications:  Current Outpatient Medications   Medication Sig Dispense Refill     acetaminophen (TYLENOL) 325 MG tablet Take 2 tablets (650 mg) by mouth every 4 hours as needed for pain 100 tablet 0     Calcium Carb-Cholecalciferol (CALCIUM 600 + D PO) Take by mouth daily       calcium carbonate (TUMS) 500 MG chewable tablet Take 2 chew tab by mouth daily as needed       Cholecalciferol (VITAMIN D-3 PO) Take 1,000 Units by mouth daily.        levothyroxine (SYNTHROID/LEVOTHROID) 50 MCG tablet Take 1 tablet (50 mcg) by mouth daily 90 tablet 3     MULTIVITAMIN TABS   OR 1 TABLET DAILY       Flaxseed, Linseed, (FLAX SEED OIL) 1000 MG capsule Take 1 capsule by mouth 2 times daily          Laboratory/Imaging Studies:  Orders Only on 11/20/2020   Component Date Value Ref Range Status     Lactate Dehydrogenase 11/20/2020 181  81 - 234 U/L Final     Sodium 11/20/2020 141  133 - 144 mmol/L Final     Potassium 11/20/2020 4.0  3.4 - 5.3 mmol/L Final     Chloride 11/20/2020 106  94 - 109 mmol/L Final     Carbon Dioxide 11/20/2020 30  20 - 32 mmol/L Final     Anion Gap 11/20/2020 5  3 - 14 mmol/L Final     Glucose 11/20/2020 90  70 - 99 mg/dL Final     Urea Nitrogen 11/20/2020 14  7 - 30 mg/dL Final     Creatinine 11/20/2020 0.81  0.52 - 1.04 mg/dL Final     GFR Estimate 11/20/2020 69  >60 mL/min/[1.73_m2] Final    Comment: Non  GFR Calc  Starting 12/18/2018, serum creatinine based estimated GFR  (eGFR) will be   calculated using the Chronic Kidney Disease Epidemiology Collaboration   (CKD-EPI) equation.       GFR Estimate If Black 11/20/2020 80  >60 mL/min/[1.73_m2] Final    Comment:  GFR Calc  Starting 12/18/2018, serum creatinine based estimated GFR (eGFR) will be   calculated using the Chronic Kidney Disease Epidemiology Collaboration   (CKD-EPI) equation.       Calcium 11/20/2020 9.2  8.5 - 10.1 mg/dL Final     Bilirubin Total 11/20/2020 0.6  0.2 - 1.3 mg/dL Final     Albumin 11/20/2020 3.8  3.4 - 5.0 g/dL Final     Protein Total 11/20/2020 6.7* 6.8 - 8.8 g/dL Final     Alkaline Phosphatase 11/20/2020 49  40 - 150 U/L Final     ALT 11/20/2020 18  0 - 50 U/L Final     AST 11/20/2020 19  0 - 45 U/L Final     WBC 11/20/2020 4.5  4.0 - 11.0 10e9/L Final     RBC Count 11/20/2020 3.99  3.8 - 5.2 10e12/L Final     Hemoglobin 11/20/2020 13.2  11.7 - 15.7 g/dL Final     Hematocrit 11/20/2020 40.2  35.0 - 47.0 % Final     MCV 11/20/2020 101* 78 - 100 fl Final     MCH 11/20/2020 33.1* 26.5 - 33.0 pg Final     MCHC 11/20/2020 32.8  31.5 - 36.5 g/dL Final     RDW 11/20/2020 12.5  10.0 - 15.0 % Final     Platelet Count 11/20/2020 147* 150 - 450 10e9/L Final     Diff Method 11/20/2020 Automated Method   Final     % Neutrophils 11/20/2020 58.7  % Final     % Lymphocytes 11/20/2020 31.6  % Final     % Monocytes 11/20/2020 8.4  % Final     % Eosinophils 11/20/2020 0.9  % Final     % Basophils 11/20/2020 0.2  % Final     % Immature Granulocytes 11/20/2020 0.2  % Final     Nucleated RBCs 11/20/2020 0  0 /100 Final     Absolute Neutrophil 11/20/2020 2.6  1.6 - 8.3 10e9/L Final     Absolute Lymphocytes 11/20/2020 1.4  0.8 - 5.3 10e9/L Final     Absolute Monocytes 11/20/2020 0.4  0.0 - 1.3 10e9/L Final     Absolute Basophils 11/20/2020 0.0  0.0 - 0.2 10e9/L Final     Abs Immature Granulocytes 11/20/2020 0.0  0 - 0.4 10e9/L Final     Absolute Nucleated RBC 11/20/2020 0.0   Final        No results found for this  or any previous visit (from the past 744 hour(s)).    Assessment and plan:    (C83.30) Diffuse large B-cell lymphoma, unspecified body region (H)  (primary encounter diagnosis)  I reviewed with the patient today most recent laboratory test and imaging studies.  There is no clinical evidence of lymphoma recurrence.  We will continue monitoring the patient's symptoms.  I will see the patient again in 1 year time or sooner if there are new developments or concerns.    The patient is ready to learn, no apparent learning barriers were identified.  Diagnosis and treatment plans were explained to the patient. The patient expressed understanding of the content. The patient asked appropriate questions. The patient questions were answered to her satisfaction.    Telephone call lasted 15 minutes.    Wayne Perez MD    Chart documentation with Dragon Voice recognition Software. Although reviewed after completion, some words and grammatical errors may remain.                                                      Again, thank you for allowing me to participate in the care of your patient.        Sincerely,        Wayne Perez MD

## 2020-12-01 NOTE — PROGRESS NOTES
"Lilli Russo is a 80 year old female who is being evaluated via a billable telephone visit.      The patient has been notified of following:     \"This telephone visit will be conducted via a call between you and your physician/provider. We have found that certain health care needs can be provided without the need for a physical exam.  This service lets us provide the care you need with a short phone conversation.  If a prescription is necessary we can send it directly to your pharmacy.  If lab work is needed we can place an order for that and you can then stop by our lab to have the test done at a later time.    Telephone visits are billed at different rates depending on your insurance coverage. During this emergency period, for some insurers they may be billed the same as an in-person visit.  Please reach out to your insurance provider with any questions.    If during the course of the call the physician/provider feels a telephone visit is not appropriate, you will not be charged for this service.\"    Patient has given verbal consent for Telephone visit?  Yes    What phone number would you like to be contacted at? [phone]        Oncology Rooming Note    December 1, 2020 2:11 PM   Lilli Russo is a 80 year old female who presents for:    Chief Complaint   Patient presents with     Oncology Clinic Visit     1 year follow up-Diffuse large B-cell lymphoma     Results     labs-11/20/20     Initial Vitals: There were no vitals taken for this visit. Estimated body mass index is 17.35 kg/m  as calculated from the following:    Height as of 6/15/20: 1.473 m (4' 10\").    Weight as of 6/15/20: 37.6 kg (83 lb). There is no height or weight on file to calculate BSA.  Data Unavailable Comment: Data Unavailable   No LMP recorded. Patient is postmenopausal.  Allergies reviewed: Yes  Medications reviewed: Yes    Medications: Medication refills not needed today.  Pharmacy name entered into EPIC:    [facility] - [place], [place] " - [address] [place] [address] RX - [place], [place] - [address] [facility]    Clinical concerns: None. Concerns reviewed with Dr. Chris Lopez, [facility]

## 2020-12-19 ENCOUNTER — TELEPHONE (OUTPATIENT)
Dept: EMERGENCY MEDICINE | Facility: CLINIC | Age: 80
End: 2020-12-19

## 2020-12-19 ENCOUNTER — HOSPITAL ENCOUNTER (EMERGENCY)
Facility: CLINIC | Age: 80
Discharge: HOME | End: 2020-12-19
Attending: FAMILY MEDICINE | Admitting: FAMILY MEDICINE
Payer: MEDICARE

## 2020-12-19 VITALS
HEART RATE: 64 BPM | SYSTOLIC BLOOD PRESSURE: 138 MMHG | DIASTOLIC BLOOD PRESSURE: 64 MMHG | RESPIRATION RATE: 17 BRPM | OXYGEN SATURATION: 100 % | WEIGHT: 90 LBS | TEMPERATURE: 97.8 F | BODY MASS INDEX: 18.81 KG/M2

## 2020-12-19 DIAGNOSIS — S01.01XA LACERATION OF SCALP, INITIAL ENCOUNTER: ICD-10-CM

## 2020-12-19 DIAGNOSIS — S01.81XA LACERATION OF FOREHEAD, INITIAL ENCOUNTER: ICD-10-CM

## 2020-12-19 PROCEDURE — 12002 RPR S/N/AX/GEN/TRNK2.6-7.5CM: CPT | Performed by: FAMILY MEDICINE

## 2020-12-19 PROCEDURE — 99283 EMERGENCY DEPT VISIT LOW MDM: CPT | Performed by: FAMILY MEDICINE

## 2020-12-19 PROCEDURE — 12013 RPR F/E/E/N/L/M 2.6-5.0 CM: CPT | Mod: 59 | Performed by: FAMILY MEDICINE

## 2020-12-19 PROCEDURE — 99282 EMERGENCY DEPT VISIT SF MDM: CPT | Mod: 25 | Performed by: FAMILY MEDICINE

## 2020-12-19 NOTE — ED AVS SNAPSHOT
[facility] Emergency Dept  [address] [place] [address]  Phone: [phone]  Fax: [phone]                                    Lilli Russo   MRN: [id number]    Department: [facility] Emergency Dept   Date of Visit: 12/19/2020           After Visit Summary Signature Page    I have received my discharge instructions, and my questions have been answered. I have discussed any challenges I see with this plan with the nurse or doctor.    ..........................................................................................................................................  Patient/Patient Representative Signature      ..........................................................................................................................................  Patient Representative Print Name and Relationship to Patient    ..................................................               ................................................  Date                                   Time    ..........................................................................................................................................  Reviewed by Signature/Title    ...................................................              ..............................................  Date                                               Time          22EPIC Rev 08/18

## 2020-12-19 NOTE — ED NOTES
Dressing dry and intact.  Writer spoke with  twice with updates.  He will pick pt up and be with her the rest of the day for monitoring.

## 2020-12-19 NOTE — ED PROVIDER NOTES
ED Provider Note     Lilli Russo  [id number]  2020      CC:     Chief Complaint   Patient presents with     Fall     Head Laceration          History is obtained from the patient and EMS personnel    HPI: Lilli Russo is a 80 year old female presenting with injuries to the head and forehead after falling at home.  Patient was getting up from her bedroom to go into the bathroom.  She did not turn the light on and became disoriented.  She fell and hit a wooden Hope chest.  She denied loss of consciousness.  Patient has a visible wound to the left forehead and some dried blood to the top of the head.  Patient denies any significant neck pain.  She typically has poor vision from her macular degeneration so there has been no change in her vision.  She denies any other pain such as to the neck, back, chest, shoulders, etc.  The patient's tetanus status is current and not due until .  Patient is not on any chronic anticoagulant.  She has a history of lymphoma in remission.    PMH/Problem List:   Past Medical History:   Diagnosis Date     Cancer (H) 2014     Macular degeneration (senile) of retina, unspecified      Osteoporosis, unspecified      Pure hypercholesterolemia      Unspecified closed fracture of pelvis 09     Unspecified hypothyroidism        PSH:   Past Surgical History:   Procedure Laterality Date      SECTION       CHOLECYSTECTOMY       INSERT CHEST TUBE  10/30/2013    Procedure: INSERT CHEST TUBE;  Left chest tube insertion;  Surgeon: Dawood Yeh MD;  Location:  OR     INSERT PORT VASCULAR ACCESS  10/30/2013    Procedure: INSERT PORT VASCULAR ACCESS;  Power port placement for Chemotherapy;  Surgeon: Umesh Brown MD;  Location:  OR     THYROID SURGERY      partial thyroidectomy       MEDS: No current facility-administered medications on file prior to encounter.        acetaminophen (TYLENOL)  325 MG tablet, Take 2 tablets (650 mg) by mouth every 4 hours as needed for pain       Calcium Carb-Cholecalciferol (CALCIUM 600 + D PO), Take by mouth daily       calcium carbonate (TUMS) 500 MG chewable tablet, Take 2 chew tab by mouth daily as needed       Cholecalciferol (VITAMIN D-3 PO), Take 1,000 Units by mouth daily.        Flaxseed, Linseed, (FLAX SEED OIL) 1000 MG capsule, Take 1 capsule by mouth 2 times daily       levothyroxine (SYNTHROID/LEVOTHROID) 50 MCG tablet, Take 1 tablet (50 mcg) by mouth daily       MULTIVITAMIN TABS   OR, 1 TABLET DAILY        Allergies: Ceftazidime    Triage and nursing notes were reviewed.    ROS: All other systems were reviewed and are negative    Physical Exam:  Vitals:    12/19/20 0410 12/19/20 0415 12/19/20 0430 12/19/20 0445   BP: (!) 157/73 (!) 151/74 (!) 148/67 (!) 145/82   Pulse: 59 59 64 66   Resp: 18      Temp: 97.8  F (36.6  C)      TempSrc: Oral      SpO2: 98% 99% 100% 100%   Weight: 40.8 kg (90 lb)        GENERAL APPEARANCE: Alert and oriented x3.  GCS 15  HEAD: Dried blood on the top of the head; visible laceration to the left forehead  EYES: PERRL, EOMI  HENT: No other injuries noted.  No dental, jaw or other facial injuries other than what is noted above  NECK: no adenopathy or masses, trachea is midline; no midline tenderness; patient has good range of motion  RESP: lungs clear to auscultation - no rales, rhonchi or wheezes  CV: regular rate and rhythm, no significant murmurs or rubs  ABDOMEN: soft, nontender, no masses with normal bowel sounds  EXT: No visible bruises  SKIN: See photos below  NEURO: mentation and speech normal; no focal deficits                  Labs/Imaging Results:  No results found for this or any previous visit (from the past 24 hour(s)).      Procedure Note: Patient has 2 lacerations.  There is a 3 cm laceration to the apex of the head, and a 3.5 cm laceration to the left forehead.  The wound was locally anesthetized with a total of 7  mL of 1% lidocaine with epinephrine.  The wound was then cleaned, draped in usual sterile fashion and closed with a total of 8 stitches, 4 stitches in each of the 2 lacerations.  Patient tolerated the closure well.      Impression:  Final diagnoses:   Laceration of forehead - 3.5 cm   Laceration of scalp - 3 cm         ED Course & Medical Decision Making (Plan):  Lilli Russo is a 80 year old female with a fall in the dark as she was trying to go to the bathroom in the middle of the night.  Patient became disoriented and tripped and fell on a wooden Hope chest.  Patient arrived by EMS.  She had a visible laceration to the left forehead and we discovered another laceration to the apex of the scalp.  Total length of both lacerations measured 6.5 cm.  Each of the lacerations were closed with 4 sutures for a total of 8 stitches using 4-0 Ethilon material.  Aftercare instructions were discussed.  Patient had no obvious signs of concussion and low suspicion for intracranial injury.  The patient's  felt comfortable with observing over the next 12-24 hours and will do sleep monitoring.  Follow-up in approximately 7 days for suture removal.  Return sooner with any new or worsening symptoms.    Written after-visit summary and instructions were given at the time of discharge.          Follow Up Plan:  Brian Roman MD  [address] [id number] [phone]    In 1 week  For suture removal    [facility] Emergency Dept  [address] [id number] [phone]    If symptoms worsen          Disclaimer: This note consists of words and symbols derived from keyboarding and dictation using voice recognition software.  As a result, there may be errors that have gone undetected.  Please consider this when interpreting information found in this note.       Dhruv Dailey MD  12/19/20 [id number]

## 2020-12-19 NOTE — DISCHARGE INSTRUCTIONS
You had 2 lacerations to the head, 1 over the top of the head and the other over the left forehead.  You had a total of 8 stitches.  Please keep the area clean and dry until tonight.  You can then begin to wash gently with shampoo/soap and water.  Please see the attached handout on laceration care and instructions for sleep monitoring.  Follow-up in approximately 7 days for suture removal.  We will asked Dr. Roman to contact you to make an appointment.

## 2020-12-19 NOTE — ED NOTES
Bed: ED07  Expected date:   Expected time:   Means of arrival:   Comments:  EMS- [place]   80 year female   fall

## 2020-12-19 NOTE — TELEPHONE ENCOUNTER
Called and relayed MD's message to pt. Pt verbalized understanding. No further questions.   ER follow up in 7-10 days for suture removal per JAKE VICENTE. Please call to schedule an appointment. dmt

## 2020-12-19 NOTE — ED TRIAGE NOTES
Pt presents by EMS after a fall.  Pt was walking in her room to the bathroom when she was disoriented and fell into a hope chest.  Pt has a laceration on the left side of the head.  No LOC.  Pt arrived in a C-collar.

## 2020-12-21 NOTE — TELEPHONE ENCOUNTER
Attempted to contact patient at number given to schedule ED follow up with Dr. Brian Roman. There was no answer and a busy tone. Please attempt to contact patient again later.  Edel Wan, CMA

## 2020-12-21 NOTE — TELEPHONE ENCOUNTER
I have attempted to contact this patient by phone with the following results: left message to return my call on answering machine.  Blanca Xie, [facility]

## 2020-12-22 NOTE — TELEPHONE ENCOUNTER
Patient is scheduled with Dr. Roman on 12/29 at 10:45.    Leela Curtis CMA ([facility]) 12/22/2020

## 2020-12-29 ENCOUNTER — OFFICE VISIT (OUTPATIENT)
Dept: INTERNAL MEDICINE | Facility: CLINIC | Age: 80
End: 2020-12-29
Payer: MEDICARE

## 2020-12-29 VITALS
SYSTOLIC BLOOD PRESSURE: 112 MMHG | HEART RATE: 74 BPM | OXYGEN SATURATION: 96 % | TEMPERATURE: 97.7 F | BODY MASS INDEX: 17.56 KG/M2 | RESPIRATION RATE: 16 BRPM | WEIGHT: 84 LBS | DIASTOLIC BLOOD PRESSURE: 72 MMHG

## 2020-12-29 DIAGNOSIS — S01.01XD LACERATION OF SCALP, SUBSEQUENT ENCOUNTER: ICD-10-CM
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
## 2020-12-29 PROCEDURE — 99213 OFFICE O/P EST LOW 20 MIN: CPT | Performed by: INTERNAL MEDICINE

## 2020-12-29 ASSESSMENT — PAIN SCALES - GENERAL: PAINLEVEL: NO PAIN (0)

## 2020-12-29 NOTE — PROGRESS NOTES
Subjective     Lilli Russo is a 80 year old female who presents to clinic today for the following health issues:    HPI         ED/UC Followup:    Facility:  [facility]  Date of visit: 12/19/20  Reason for visit: Fall  Laceration  Current Status: Follow up - suture removal     Tripped or disoriented in dark and fell down, hit head.     8 stitches in the ER 5 and 3.    Feeling ok, no headaches.    Cognition and mentation are ok.      Review of Systems         Objective    /72   Pulse 74   Temp 97.7  F (36.5  C) (Temporal)   Resp 16   Wt 38.1 kg (84 lb)   SpO2 96%   BMI 17.56 kg/m    Body mass index is 17.56 kg/m .  Physical Exam   lacerations on the left forehead and on the top of the scalp are healed, closed, clean and dry.    4 stitches are removed out of each laceration no complications.              Assessment & Plan     Lilli was seen today for er f/u.    Diagnoses and all orders for this visit:    Fall, subsequent encounter    Laceration of scalp, subsequent encounter    80-year-old female who fell when she was getting up to go the bathroom in the middle the night.  Hit her head on hope chest she had 2 lacerations was evaluated in the emergency room received 8 stitches, four in each laceration.  She has done well there healed well its been over 10 days.  The stitches are removed today.  These.days. Removed today.     No persistent symptoms or concerns.           No follow-ups on file.    Brian Roman MD  [facility]

## 2021-06-21 ENCOUNTER — TELEPHONE (OUTPATIENT)
Dept: INTERNAL MEDICINE | Facility: CLINIC | Age: 81
End: 2021-06-21

## 2021-06-21 ENCOUNTER — OFFICE VISIT (OUTPATIENT)
Dept: INTERNAL MEDICINE | Facility: CLINIC | Age: 81
End: 2021-06-21
Payer: MEDICARE

## 2021-06-21 VITALS
OXYGEN SATURATION: 96 % | SYSTOLIC BLOOD PRESSURE: 128 MMHG | HEIGHT: 58 IN | TEMPERATURE: 97.5 F | DIASTOLIC BLOOD PRESSURE: 72 MMHG | WEIGHT: 84 LBS | HEART RATE: 70 BPM | RESPIRATION RATE: 16 BRPM | BODY MASS INDEX: 17.63 KG/M2

## 2021-06-21 DIAGNOSIS — E03.4 HYPOTHYROIDISM DUE TO ACQUIRED ATROPHY OF THYROID: Primary | ICD-10-CM

## 2021-06-21 DIAGNOSIS — F43.21 GRIEVING: ICD-10-CM

## 2021-06-21 DIAGNOSIS — R63.4 WEIGHT LOSS: ICD-10-CM

## 2021-06-21 DIAGNOSIS — C83.30 DIFFUSE LARGE B-CELL LYMPHOMA, UNSPECIFIED BODY REGION (H): ICD-10-CM

## 2021-06-21 LAB — TSH SERPL DL<=0.005 MIU/L-ACNC: 3.98 MU/L (ref 0.4–4)

## 2021-06-21 PROCEDURE — 99214 OFFICE O/P EST MOD 30 MIN: CPT | Performed by: INTERNAL MEDICINE

## 2021-06-21 PROCEDURE — 36415 COLL VENOUS BLD VENIPUNCTURE: CPT | Performed by: INTERNAL MEDICINE

## 2021-06-21 PROCEDURE — 84443 ASSAY THYROID STIM HORMONE: CPT | Performed by: INTERNAL MEDICINE

## 2021-06-21 RX ORDER — LEVOTHYROXINE SODIUM 50 UG/1
50 TABLET ORAL DAILY
Qty: 90 TABLET | Refills: 3 | Status: SHIPPED | OUTPATIENT
Start: 2021-06-21 | End: 2022-07-11

## 2021-06-21 ASSESSMENT — PAIN SCALES - GENERAL: PAINLEVEL: NO PAIN (0)

## 2021-06-21 ASSESSMENT — MIFFLIN-ST. JEOR: SCORE: 735.77

## 2021-06-21 NOTE — TELEPHONE ENCOUNTER
----- Message from Brian Roman MD sent at 6/21/2021  2:12 PM CDT -----  Please call let her know her thyroid is normal.  She can continue the 50 mcg daily.

## 2021-06-21 NOTE — PROGRESS NOTES
Assessment & Plan     Hypothyroidism due to acquired atrophy of thyroid  Postoperative hypothyroidism she is on Synthroid 50 mcg a day doing well, will check her TSH and continue her medication.  - TSH with free T4 reflex  - levothyroxine (SYNTHROID/LEVOTHROID) 50 MCG tablet; Take 1 tablet (50 mcg) by mouth daily    Diffuse large B-cell lymphoma, unspecified body region (H)  Large B-cell lymphoma treated with stem cell transplant  doing well follows with oncology.  Stable    Grieving  Grieving the loss of her sister-in-law, sister passed away and her  who is on hospice.  Unfortunately the patient does not drive they live in their own home he continues to drive but sounds like he has recurring rectal cancer    Weight loss  Patient has chronic weight loss and low weight, she is eating more including ice cream every night.    Return in about 1 year (around 2022) for Physical Exam.    Brian Roman MD  [facility] PIPER Reeder is a 81 year old who presents for the following health issues     HPI     Chief Complaint   Patient presents with     Thyroid Disease     f/u      started hospice in September, still hanging in there.  Sister broke her hip and .      Feeling ok, some stomachaches and stress.  Eating some, weight is still down at 84#.  Has ice cream at night.     Only medication is the levothyroxine, doing ok, needs refills.      B cell lymphoma treated with stem cell transplant in . Follows with oncology yearly.     Past Medical History:   Diagnosis Date     Cancer (H) 2014    lymphoma, large b cell, stem cell transplant     Macular degeneration (senile) of retina, unspecified      Osteoporosis, unspecified      Pure hypercholesterolemia      Unspecified closed fracture of pelvis 09     Unspecified hypothyroidism      Current Outpatient Medications   Medication     acetaminophen (TYLENOL) 325 MG tablet     Calcium Carb-Cholecalciferol  "(CALCIUM 600 + D PO)     calcium carbonate (TUMS) 500 MG chewable tablet     Cholecalciferol (VITAMIN D-3 PO)     levothyroxine (SYNTHROID/LEVOTHROID) 50 MCG tablet     MULTIVITAMIN TABS   OR     No current facility-administered medications for this visit.      Social History     Tobacco Use     Smoking status: Never Smoker     Smokeless tobacco: Never Used   Substance Use Topics     Alcohol use: No     Alcohol/week: 0.0 standard drinks     Drug use: No       Review of Systems   Constitutional, HEENT, cardiovascular, pulmonary, gi and gu systems are negative, except as otherwise noted.      Objective    /72   Pulse 70   Temp 97.5  F (36.4  C) (Temporal)   Resp 16   Ht 1.473 m (4' 10\")   Wt 38.1 kg (84 lb)   SpO2 96%   BMI 17.56 kg/m    Body mass index is 17.56 kg/m .  Physical Exam   Thin and frail but no acute distress  Heart is regular  Lungs are clear  Neck is supple  Extremities without edema.            "

## 2021-11-16 ENCOUNTER — TELEPHONE (OUTPATIENT)
Dept: ONCOLOGY | Facility: CLINIC | Age: 81
End: 2021-11-16
Payer: MEDICARE

## 2021-11-16 NOTE — TELEPHONE ENCOUNTER
I have attempted to contact this patient by phone with the following results: left message to return my call on answering machine.  Pt has an appt scheduled with Gertrude Salvador on 11/30. Need to move this to a virtual with Dr. Corbin on same day. May have to move her lab appt also. Labs should be 15 min before her oncology appt.   She can use the ipad here at the clinic right after lab to do the video visit.   Blanca Xie, [facility]

## 2021-11-17 NOTE — TELEPHONE ENCOUNTER
I have attempted to contact this patient by phone with the following results: left message to return my call on answering machine.  Blanca Xie, [facility]

## 2021-11-29 NOTE — PROGRESS NOTES
Hematology/Oncology Visit    Care Team:  - Oncologist: Dr. Perez  - PCP: Brian Roman MD    Reason for visit: yearly visit with labs    Diagnosis: intravascular large B-cell lymphoma    Cancer and Treatment Hx:  Sept 2013: presented with fever/chills, fatigue, cough, night sweats, and early satiety. Found to be pancytopenic (WBC 2.4, Hgb 10.2, platelets 74). CT CAP with mild splenomegaly. Infectious work-up negative. 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 CSF negative and brain MRI without involvement.   Sept-Jan 2014: MR-CHOP x6 cycles (prophylactic IT methotrexate), repeat BMBx with CR  May 5, 2015: peripheral blood stem cell transplant with BEAM prep    Interval History:  [facility] has generally been well since her last oncology appt. She notes that she has had more stress recently as her husbands health is declining and he wants to be very active every day which tires her out. He was diagnosed with terminal rectal cancer a few years ago and is on hospice but she has noticed he has started dropping weight and not doing as well in the past 6 months. She deals with the stress with exercise and notes that she has a 2.5hr regimen daily. She denies any new symptoms, specifically soaking night sweats, unintentional weight loss, early satiety, new pain, or lymphadenopathy. She notes a low appetite for years but stable weight, and regular BMs. She notes that she will likely move once her  passes, unsure if to [place]. She has no questions or concerns today.    Medications:  Discussed pertinent medications.    Physical Exam:  GEN: pleasantly conversant elderly thin female no acute distress  SKIN: generally intact, no visible rash, bruising, or sores   ENT: eyes non-icteric, no notable oral sores  LYMPH: no notable cervical, supraclavicular, or axillary lymphadenopathy. Does have soft mass consistent with a lipoma on posterior cervical spine, pt  "reports present for 20+ years.  RESP: clear to auscultation bilaterally, on room air  CARDS: regular rate and rhythm, no notable murmurs   GI: abd soft without notable hepatosplenomegaly, non-tender to palpation  MSK: no notable lower extremity edema  NEURO: alert and oriented without obvious focal deficit    /64 (BP Location: Right arm, Patient Position: Sitting, Cuff Size: Adult Regular)   Pulse 83   Temp (!) 96.3  F (35.7  C) (Temporal)   Resp 18   Ht 1.473 m (4' 10\")   Wt 37.4 kg (82 lb 7 oz)   SpO2 96%   BMI 17.23 kg/m       Wt Readings from Last 4 Encounters:   11/30/21 37.4 kg (82 lb 7 oz)   06/21/21 38.1 kg (84 lb)   12/29/20 38.1 kg (84 lb)     Labs:  Result Value    Sodium 139    Potassium 4.0    Chloride 104    Carbon Dioxide (CO2) 30    Anion Gap 5    Urea Nitrogen 13    Creatinine 0.77    Calcium 9.2    Glucose 90    Alkaline Phosphatase 48    AST 23    ALT 22    Protein Total 6.9    Albumin 3.7    Bilirubin Total 0.6    GFR Estimate 73   Result Value    Lactate Dehydrogenase 216   Result Value    WBC Count 5.4    Hemoglobin 13.7    Platelet Count 143 (L)    Absolute Neutrophils 3.2       Imaging:  Reviewed CT CAP from 6/16/20:  IMPRESSION:  1. No etiology for patient's weight loss is identified.  2. Intrahepatic and extrahepatic biliary ductal dilatation is likely  compensatory status post prior cholecystectomy. This is similar to the  prior study dated 12/17/2019.  3. Colonic diverticulosis.  4. No evidence for recurrent lymphoma is seen.    Assessment and Plan:  Intravascular large B-cell lymphoma  - Status post PBSCTx in May 2014 with remission  - No current clinical or laboratory evidence of disease recurrence  - No current B-symptoms  - Continue lab and oncology provider follow-up yearly, sooner if concerns    Caregiver stress  -  is on hospice for rectal cancer, he is currently independent  - She declines need for resources at this time      Future Appointments   Date Time " Provider Department Center   11/30/2021 10:30 AM PH LAB [facility]   11/30/2021 11:00 AM Gertrude Salvador APRN CNP [facility]   The total time of this encounter amounted to 30 minutes today. This time includes face-to-face time spent with the patient, prep work, ordering tests, and performing post-visit documentation.    Gertrude Salvador, TED

## 2021-11-30 ENCOUNTER — LAB (OUTPATIENT)
Dept: LAB | Facility: CLINIC | Age: 81
End: 2021-11-30
Payer: MEDICARE

## 2021-11-30 ENCOUNTER — ONCOLOGY VISIT (OUTPATIENT)
Dept: ONCOLOGY | Facility: CLINIC | Age: 81
End: 2021-11-30
Payer: MEDICARE

## 2021-11-30 VITALS
SYSTOLIC BLOOD PRESSURE: 102 MMHG | TEMPERATURE: 96.3 F | DIASTOLIC BLOOD PRESSURE: 64 MMHG | HEIGHT: 58 IN | OXYGEN SATURATION: 96 % | HEART RATE: 83 BPM | BODY MASS INDEX: 17.3 KG/M2 | WEIGHT: 82.44 LBS | RESPIRATION RATE: 18 BRPM

## 2021-11-30 DIAGNOSIS — C83.30 DIFFUSE LARGE B-CELL LYMPHOMA, UNSPECIFIED BODY REGION (H): ICD-10-CM

## 2021-11-30 DIAGNOSIS — Z94.81 STATUS POST BONE MARROW TRANSPLANT (H): ICD-10-CM

## 2021-11-30 DIAGNOSIS — C83.30 DIFFUSE LARGE B-CELL LYMPHOMA, UNSPECIFIED BODY REGION (H): Primary | ICD-10-CM

## 2021-11-30 DIAGNOSIS — Z63.6 CAREGIVER STRESS: ICD-10-CM

## 2021-11-30 LAB
ALBUMIN SERPL-MCNC: 3.7 G/DL (ref 3.4–5)
ALP SERPL-CCNC: 48 U/L (ref 40–150)
ALT SERPL W P-5'-P-CCNC: 22 U/L (ref 0–50)
ANION GAP SERPL CALCULATED.3IONS-SCNC: 5 MMOL/L (ref 3–14)
AST SERPL W P-5'-P-CCNC: 23 U/L (ref 0–45)
BASOPHILS # BLD AUTO: 0 10E3/UL (ref 0–0.2)
BASOPHILS NFR BLD AUTO: 0 %
BILIRUB SERPL-MCNC: 0.6 MG/DL (ref 0.2–1.3)
BUN SERPL-MCNC: 13 MG/DL (ref 7–30)
CALCIUM SERPL-MCNC: 9.2 MG/DL (ref 8.5–10.1)
CHLORIDE BLD-SCNC: 104 MMOL/L (ref 94–109)
CO2 SERPL-SCNC: 30 MMOL/L (ref 20–32)
CREAT SERPL-MCNC: 0.77 MG/DL (ref 0.52–1.04)
EOSINOPHIL # BLD AUTO: 0.1 10E3/UL (ref 0–0.7)
EOSINOPHIL NFR BLD AUTO: 1 %
ERYTHROCYTE [DISTWIDTH] IN BLOOD BY AUTOMATED COUNT: 12.5 % (ref 10–15)
GFR SERPL CREATININE-BSD FRML MDRD: 73 ML/MIN/1.73M2
GLUCOSE BLD-MCNC: 90 MG/DL (ref 70–99)
HCT VFR BLD AUTO: 41.4 % (ref 35–47)
HGB BLD-MCNC: 13.7 G/DL (ref 11.7–15.7)
IMM GRANULOCYTES # BLD: 0 10E3/UL
IMM GRANULOCYTES NFR BLD: 0 %
LDH SERPL L TO P-CCNC: 216 U/L (ref 81–234)
LYMPHOCYTES # BLD AUTO: 1.7 10E3/UL (ref 0.8–5.3)
LYMPHOCYTES NFR BLD AUTO: 31 %
MCH RBC QN AUTO: 33.6 PG (ref 26.5–33)
MCHC RBC AUTO-ENTMCNC: 33.1 G/DL (ref 31.5–36.5)
MCV RBC AUTO: 102 FL (ref 78–100)
MONOCYTES # BLD AUTO: 0.5 10E3/UL (ref 0–1.3)
MONOCYTES NFR BLD AUTO: 8 %
NEUTROPHILS # BLD AUTO: 3.2 10E3/UL (ref 1.6–8.3)
NEUTROPHILS NFR BLD AUTO: 60 %
NRBC # BLD AUTO: 0 10E3/UL
NRBC BLD AUTO-RTO: 0 /100
PLATELET # BLD AUTO: 143 10E3/UL (ref 150–450)
POTASSIUM BLD-SCNC: 4 MMOL/L (ref 3.4–5.3)
PROT SERPL-MCNC: 6.9 G/DL (ref 6.8–8.8)
RBC # BLD AUTO: 4.08 10E6/UL (ref 3.8–5.2)
SODIUM SERPL-SCNC: 139 MMOL/L (ref 133–144)
WBC # BLD AUTO: 5.4 10E3/UL (ref 4–11)

## 2021-11-30 PROCEDURE — 99214 OFFICE O/P EST MOD 30 MIN: CPT | Performed by: NURSE PRACTITIONER

## 2021-11-30 PROCEDURE — 80053 COMPREHEN METABOLIC PANEL: CPT

## 2021-11-30 PROCEDURE — 83615 LACTATE (LD) (LDH) ENZYME: CPT

## 2021-11-30 PROCEDURE — 85025 COMPLETE CBC W/AUTO DIFF WBC: CPT

## 2021-11-30 PROCEDURE — 36415 COLL VENOUS BLD VENIPUNCTURE: CPT

## 2021-11-30 SDOH — SOCIAL STABILITY - SOCIAL INSECURITY: DEPENDENT RELATIVE NEEDING CARE AT HOME: Z63.6

## 2021-11-30 ASSESSMENT — PAIN SCALES - GENERAL: PAINLEVEL: NO PAIN (0)

## 2021-11-30 ASSESSMENT — MIFFLIN-ST. JEOR: SCORE: 728.68

## 2021-11-30 NOTE — PROGRESS NOTES
DISCHARGE PLAN:  Next appointments: See patient instruction section  Departure Mode: Ambulatory  Accompanied by: self  3 minutes for nursing discharge (face to face time)     Lilli Russo is here today for Oncology follow up.  Writing nurse saw patient after Medical Oncology appointment to address questions/concerns/coordinate care. Patient to follow up in 1 year with labs.  Patient ambulated by nurse to  to schedule follow up and/or lab appointments.  Imaging scheduled if ordered.  See patient instructions and Oncologist's Progress note for further details. Questions and concerns addressed to patient's satisfaction. Patient verbalized and demonstrated understanding of plan.  Contact information provided and patient is encouraged to call with any that arise in the interim of care.    Parish Rodney, RN, BSN, OCN   Oncology Care Coordinator RN  [facility]  [phone]  11/30/2021, 3:01 PM

## 2021-11-30 NOTE — PATIENT INSTRUCTIONS
Please follow up with Dr. Corbin or Gertrude Salvador CNP in 1 year.  Please schedule labs prior to follow up appointment.    Lab Date/Time:    Office visit follow up with Dr. Corbin or Gertrude Salvador CNP Date/Time:     If you have any questions or concerns please feel free to call.    If you need to reschedule please call:  Clinic or Lab Appointment - [phone]  Infusion - [phone]  Imaging - [phone]    Parish Rodney RN, BSN, OCN  [facility]   Oncology/Hematology Care Coordinator RN  [facility]  [phone]    After Hours Oncology Nurse Line - [phone]

## 2021-11-30 NOTE — LETTER
11/30/2021         RE: Lilli Russo  [address]  [facility] [address]      Hematology/Oncology Visit    Care Team:  - Oncologist: Dr. Perez  - PCP: Brian Roman MD    Reason for visit: yearly visit with labs    Diagnosis: intravascular large B-cell lymphoma    Cancer and Treatment Hx:  Sept 2013: presented with fever/chills, fatigue, cough, night sweats, and early satiety. Found to be pancytopenic (WBC 2.4, Hgb 10.2, platelets 74). CT CAP with mild splenomegaly. Infectious work-up negative. 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 CSF negative and brain MRI without involvement.   Sept-Jan 2014: MR-CHOP x6 cycles (prophylactic IT methotrexate), repeat BMBx with CR  May 5, 2015: peripheral blood stem cell transplant with BEAM prep    Interval History:  Lilli's health has generally been well since her last oncology appt. She notes that she has had more stress recently as her husbands health is declining and he wants to be very active every day which tires her out. He was diagnosed with terminal rectal cancer a few years ago and is on hospice but she has noticed he has started dropping weight and not doing as well in the past 6 months. She deals with the stress with exercise and notes that she has a 2.5hr regimen daily. She denies any new symptoms, specifically soaking night sweats, unintentional weight loss, early satiety, new pain, or lymphadenopathy. She notes a low appetite for years but stable weight, and regular BMs. She notes that she will likely move once her  passes, unsure if to [place] with her daughter or up [place] near her friends. She has no questions or concerns today.    Medications:  Discussed pertinent medications.    Physical Exam:  GEN: pleasantly conversant elderly thin female no acute distress  SKIN: generally intact, no visible rash, bruising, or sores   ENT: eyes non-icteric, no notable oral sores  LYMPH: no notable cervical, supraclavicular, or axillary  "lymphadenopathy. Does have soft mass consistent with a lipoma on posterior cervical spine, pt reports present for 20+ years.  RESP: clear to auscultation bilaterally, on room air  CARDS: regular rate and rhythm, no notable murmurs   GI: abd soft without notable hepatosplenomegaly, non-tender to palpation  MSK: no notable lower extremity edema  NEURO: alert and oriented without obvious focal deficit    /64 (BP Location: Right arm, Patient Position: Sitting, Cuff Size: Adult Regular)   Pulse 83   Temp (!) 96.3  F (35.7  C) (Temporal)   Resp 18   Ht 1.473 m (4' 10\")   Wt 37.4 kg (82 lb 7 oz)   SpO2 96%   BMI 17.23 kg/m       Wt Readings from Last 4 Encounters:   11/30/21 37.4 kg (82 lb 7 oz)   06/21/21 38.1 kg (84 lb)   12/29/20 38.1 kg (84 lb)     Labs:  Result Value    Sodium 139    Potassium 4.0    Chloride 104    Carbon Dioxide (CO2) 30    Anion Gap 5    Urea Nitrogen 13    Creatinine 0.77    Calcium 9.2    Glucose 90    Alkaline Phosphatase 48    AST 23    ALT 22    Protein Total 6.9    Albumin 3.7    Bilirubin Total 0.6    GFR Estimate 73   Result Value    Lactate Dehydrogenase 216   Result Value    WBC Count 5.4    Hemoglobin 13.7    Platelet Count 143 (L)    Absolute Neutrophils 3.2       Imaging:  Reviewed CT CAP from 6/16/20:  IMPRESSION:  1. No etiology for patient's weight loss is identified.  2. Intrahepatic and extrahepatic biliary ductal dilatation is likely  compensatory status post prior cholecystectomy. This is similar to the  prior study dated 12/17/2019.  3. Colonic diverticulosis.  4. No evidence for recurrent lymphoma is seen.    Assessment and Plan:  Intravascular large B-cell lymphoma  - Status post PBSCTx in May 2014 with remission  - No current clinical or laboratory evidence of disease recurrence  - No current B-symptoms  - Continue lab and oncology provider follow-up yearly, sooner if concerns    Caregiver stress  -  is on hospice for rectal cancer, he is currently " independent  - She declines need for resources at this time      Future Appointments   Date Time Provider Department Center   11/30/2021 10:30 AM PH LAB [facility]   11/30/2021 11:00 AM Gertrude Salvador APRN CNP [facility]   The total time of this encounter amounted to 30 minutes today. This time includes face-to-face time spent with the patient, prep work, ordering tests, and performing post-visit documentation.    Gertrude Salvador, RAYMOND Mendoza CNP

## 2021-12-02 ENCOUNTER — PATIENT OUTREACH (OUTPATIENT)
Dept: CARE COORDINATION | Facility: CLINIC | Age: 81
End: 2021-12-02
Payer: MEDICARE

## 2021-12-02 NOTE — PROGRESS NOTES
Oncology Distress Screening Follow-up  Clinical Social Work  [facility]    Identified Concern and Score From Distress Screenin. How concerned are you about your ability to eat?  0       2. How concerned are you about unintended weight loss or your current weight?  4       3. How concerned are you about feeling depressed or very sad?  2       4. How concerned are you about feeling anxious or very scared?  6 Abnormal        5. Do you struggle with the loss of meaning and kristen in your life?  Not at all       6. How concerned are you about work and home life issues that may be affected by your cancer?  0       7. How concerned are you about knowing what resources are available to help you?  0       8. Do you currently have what you would describe as Orthodox or spiritual struggles?             Not at all               Date of Distress Screenin21      Data: Lilli seen in clinic for follow up b-cell lymphoma.       Intervention/Education Provided:: MARGOTH called and left message, introducing self and reason for call. MARGOTH provided contact information and encouraged Lilli to call back when she is able to discuss support and resources.       Follow-up Required: MARGOTH will await Lilli's call back.         KOBI Frye, [facility]  Clinical , Adult Oncology  Phone: [phone]

## 2022-07-11 ENCOUNTER — OFFICE VISIT (OUTPATIENT)
Dept: INTERNAL MEDICINE | Facility: CLINIC | Age: 82
End: 2022-07-11
Payer: MEDICARE

## 2022-07-11 VITALS
SYSTOLIC BLOOD PRESSURE: 118 MMHG | HEIGHT: 58 IN | OXYGEN SATURATION: 98 % | BODY MASS INDEX: 16.22 KG/M2 | WEIGHT: 77.3 LBS | HEART RATE: 78 BPM | RESPIRATION RATE: 14 BRPM | DIASTOLIC BLOOD PRESSURE: 64 MMHG | TEMPERATURE: 97.5 F

## 2022-07-11 DIAGNOSIS — E03.4 HYPOTHYROIDISM DUE TO ACQUIRED ATROPHY OF THYROID: ICD-10-CM

## 2022-07-11 DIAGNOSIS — Z63.6 CAREGIVER STRESS: ICD-10-CM

## 2022-07-11 DIAGNOSIS — R63.4 WEIGHT LOSS: ICD-10-CM

## 2022-07-11 DIAGNOSIS — Z94.81 STATUS POST BONE MARROW TRANSPLANT (H): ICD-10-CM

## 2022-07-11 DIAGNOSIS — C83.30 DIFFUSE LARGE B-CELL LYMPHOMA, UNSPECIFIED BODY REGION (H): ICD-10-CM

## 2022-07-11 DIAGNOSIS — Z23 HIGH PRIORITY FOR 2019-NCOV VACCINE: ICD-10-CM

## 2022-07-11 DIAGNOSIS — Z00.00 MEDICARE ANNUAL WELLNESS VISIT, SUBSEQUENT: Primary | ICD-10-CM

## 2022-07-11 LAB — TSH SERPL DL<=0.005 MIU/L-ACNC: 3.28 MU/L (ref 0.4–4)

## 2022-07-11 PROCEDURE — 0064A COVID-19,PF,MODERNA (18+ YRS BOOSTER .25ML): CPT | Performed by: INTERNAL MEDICINE

## 2022-07-11 PROCEDURE — 36415 COLL VENOUS BLD VENIPUNCTURE: CPT | Performed by: INTERNAL MEDICINE

## 2022-07-11 PROCEDURE — 99213 OFFICE O/P EST LOW 20 MIN: CPT | Mod: 25 | Performed by: INTERNAL MEDICINE

## 2022-07-11 PROCEDURE — G0439 PPPS, SUBSEQ VISIT: HCPCS | Performed by: INTERNAL MEDICINE

## 2022-07-11 PROCEDURE — 84443 ASSAY THYROID STIM HORMONE: CPT | Performed by: INTERNAL MEDICINE

## 2022-07-11 PROCEDURE — 91306 COVID-19,PF,MODERNA (18+ YRS BOOSTER .25ML): CPT | Performed by: INTERNAL MEDICINE

## 2022-07-11 RX ORDER — LEVOTHYROXINE SODIUM 50 UG/1
50 TABLET ORAL DAILY
Qty: 90 TABLET | Refills: 3 | Status: SHIPPED | OUTPATIENT
Start: 2022-07-11

## 2022-07-11 SDOH — SOCIAL STABILITY - SOCIAL INSECURITY: DEPENDENT RELATIVE NEEDING CARE AT HOME: Z63.6

## 2022-07-11 ASSESSMENT — ENCOUNTER SYMPTOMS
DIZZINESS: 0
PALPITATIONS: 0
ABDOMINAL PAIN: 0
SORE THROAT: 0
HEADACHES: 0
FEVER: 0
DIARRHEA: 0
FREQUENCY: 0
NAUSEA: 0
DYSURIA: 0
ARTHRALGIAS: 0
SHORTNESS OF BREATH: 0
BREAST MASS: 0
COUGH: 0
HEMATOCHEZIA: 0
HEARTBURN: 0
NERVOUS/ANXIOUS: 0
HEMATURIA: 0
JOINT SWELLING: 0
MYALGIAS: 0
CHILLS: 0
PARESTHESIAS: 0
EYE PAIN: 0
WEAKNESS: 0
CONSTIPATION: 0

## 2022-07-11 ASSESSMENT — ACTIVITIES OF DAILY LIVING (ADL): CURRENT_FUNCTION: NO ASSISTANCE NEEDED

## 2022-07-11 ASSESSMENT — PAIN SCALES - GENERAL: PAINLEVEL: NO PAIN (0)

## 2022-07-11 NOTE — PROGRESS NOTES
"SUBJECTIVE:   Lilli Russo is a 82 year old female who presents for Preventive Visit.    Patient has been advised of split billing requirements and indicates understanding: Yes  Are you in the first 12 months of your Medicare coverage?  No    Healthy Habits:     In general, how would you rate your overall health?  Good    Frequency of exercise:  6-7 days/week    Duration of exercise:  Greater than 60 minutes    Do you usually eat at least 4 servings of fruit and vegetables a day, include whole grains    & fiber and avoid regularly eating high fat or \"junk\" foods?  No    Taking medications regularly:  Yes    Medication side effects:  None    Ability to successfully perform activities of daily living:  No assistance needed    Home Safety:  No safety concerns identified    Hearing Impairment:  No hearing concerns    In the past 6 months, have you been bothered by leaking of urine?  No    In general, how would you rate your overall mental or emotional health?  Good      PHQ-2 Total Score: 0    Additional concerns today:  No    Living in a townhouse,  has rectal cancer and is on hospice. He still drives. Wants to be on the move always so wears her out.  Daughter lives in [place].      Feeling ok, tired at times, still does exercises walking and bike for an hour and weights. Does ones from the nursing home.     Bp is ok,    Weight his down 5 pounds, hard to eat, has to care for her .           Do you feel safe in your environment? Yes    Have you ever done Advance Care Planning? (For example, a Health Directive, POLST, or a discussion with a medical provider or your loved ones about your wishes): No, advance care planning information given to patient to review.  Patient declined advance care planning discussion at this time.       Fall risk  Fallen 2 or more times in the past year?: No  Any fall with injury in the past year?: No    Cognitive Screening   1) Repeat 3 items (Leader, Season, Table)  2) Clock " draw: NORMAL  3) 3 item recall: Recalls 3 objects  Results: 3 items recalled: COGNITIVE IMPAIRMENT LESS LIKELY    Mini-CogTM Copyright S [employer]. Licensed by the author for use in [facility]; reprinted with permission ([email or web address]). All rights reserved.      Do you have sleep apnea, excessive snoring or daytime drowsiness?: no    Reviewed and updated as needed this visit by clinical staff   Tobacco  Allergies  Meds   Med Hx  Surg Hx  Fam Hx  Soc Hx          Reviewed and updated as needed this visit by Provider                   Social History     Tobacco Use     Smoking status: Never Smoker     Smokeless tobacco: Never Used   Substance Use Topics     Alcohol use: No     Alcohol/week: 0.0 standard drinks     Alcohol Use 7/11/2022   Prescreen: >3 drinks/day or >7 drinks/week? Not Applicable   Prescreen: >3 drinks/day or >7 drinks/week? -     Current providers sharing in care for this patient include:   Patient Care Team:  Brian Roman MD as PCP - General (Internal Medicine)  Brian Roman MD as Assigned PCP  Rashid Corbin MD as MD (Hematology & Oncology)  Gertrude Salvador APRN CNP as Assigned Cancer Care Provider  Shelbie Angelo, RN as Specialty Care Coordinator (Hematology & Oncology)    The following health maintenance items are reviewed in Epic and correct as of today:  Health Maintenance Due   Topic Date Due     ANNUAL REVIEW OF  ORDERS  Never done     ZOSTER IMMUNIZATION (1 of 2) Never done     Pneumococcal Vaccine: 65+ Years (2 - PPSV23 or PCV20) 05/12/2017     COVID-19 Vaccine (4 - Booster for Moderna series) 02/01/2022     TSH W/FREE T4 REFLEX  06/21/2022     Lab work is in process  Pneumonia Vaccine:up to date        Pertinent mammograms are reviewed under the imaging tab.    Review of Systems   Constitutional: Negative for chills and fever.   HENT: Negative for congestion, ear pain, hearing loss and sore throat.    Eyes: Negative for pain and visual disturbance.  "  Respiratory: Negative for cough and shortness of breath.    Cardiovascular: Negative for chest pain, palpitations and peripheral edema.   Gastrointestinal: Negative for abdominal pain, constipation, diarrhea, heartburn, hematochezia and nausea.   Breasts:  Negative for tenderness, breast mass and discharge.   Genitourinary: Negative for dysuria, frequency, genital sores, hematuria, pelvic pain, urgency, vaginal bleeding and vaginal discharge.   Musculoskeletal: Negative for arthralgias, joint swelling and myalgias.   Skin: Negative for rash.   Neurological: Negative for dizziness, weakness, headaches and paresthesias.   Psychiatric/Behavioral: Negative for mood changes. The patient is not nervous/anxious.          OBJECTIVE:   /64 (BP Location: Left arm, Patient Position: Sitting, Cuff Size: Adult Regular)   Pulse 78   Temp 97.5  F (36.4  C) (Temporal)   Resp 14   Ht 1.473 m (4' 10\")   Wt 35.1 kg (77 lb 4.8 oz)   SpO2 98%   Breastfeeding No   BMI 16.16 kg/m   Estimated body mass index is 16.16 kg/m  as calculated from the following:    Height as of this encounter: 1.473 m (4' 10\").    Weight as of this encounter: 35.1 kg (77 lb 4.8 oz).  Physical Exam  GENERAL: healthy, alert, no distress and very thin  EYES: Eyes grossly normal to inspection, PERRL and conjunctivae and sclerae normal  HENT: ear canals and TM's normal, nose and mouth without ulcers or lesions  NECK: no adenopathy, no asymmetry, masses, or scars and thyroid normal to palpation  RESP: lungs clear to auscultation - no rales, rhonchi or wheezes  CV: regular rate and rhythm, normal S1 S2, no S3 or S4, no murmur, click or rub, no peripheral edema and peripheral pulses strong  ABDOMEN: soft, nontender, no hepatosplenomegaly, no masses and bowel sounds normal  MS: no gross musculoskeletal defects noted, no edema  SKIN: no suspicious lesions or rashes  NEURO: Normal strength and tone, mentation intact and speech normal  PSYCH: mentation " "appears normal, affect normal/bright    ASSESSMENT / PLAN:       ICD-10-CM    1. Medicare annual wellness visit, subsequent  Z00.00    2. Hypothyroidism due to acquired atrophy of thyroid  E03.4 TSH WITH FREE T4 REFLEX     levothyroxine (SYNTHROID/LEVOTHROID) 50 MCG tablet   3. Status post bone marrow transplant (H)  Z94.81    4. Diffuse large B-cell lymphoma, unspecified body region (H)  C83.30    5. Weight loss  R63.4    6. Caregiver stress  Z63.6      Patient is here for Medicare wellness check.  She is doing okay but having some caregiver stress taking care of her  who is rectal cancer and is on hospice.  He wants to keep going out driving and she is getting worn out by this.    Her weight is going down she is not getting to eat that she is caring for him she does stay in her room in the mornings and I discussed having an Ensure daily for breakfast there.    B-cell lymphoma is cured with bone marrow transplant doing well sees oncology yearly for labs    Hypothyroidism is okay on levothyroxine 50 mcg daily we will check her TSH today.    COVID second booster is given to her today.    COUNSELING:  Reviewed preventive health counseling, as reflected in patient instructions       Regular exercise       Healthy diet/nutrition       Immunizations    Vaccinated for: covid booster          Estimated body mass index is 16.16 kg/m  as calculated from the following:    Height as of this encounter: 1.473 m (4' 10\").    Weight as of this encounter: 35.1 kg (77 lb 4.8 oz).    Weight management plan will work on adding breakfast of Ensure daily    She reports that she has never smoked. She has never used smokeless tobacco.      Appropriate preventive services were discussed with this patient, including applicable screening as appropriate for cardiovascular disease, diabetes, osteopenia/osteoporosis, and glaucoma.  As appropriate for age/gender, discussed screening for colorectal cancer, prostate cancer, breast cancer, " and cervical cancer. Checklist reviewing preventive services available has been given to the patient.    Reviewed patients plan of care and provided an AVS. The Basic Care Plan (routine screening as documented in Health Maintenance) for Lilli meets the Care Plan requirement. This Care Plan has been established and reviewed with the Patient.    Counseling Resources:  ATP IV Guidelines  Pooled Cohorts Equation Calculator  Breast Cancer Risk Calculator  Breast Cancer: Medication to Reduce Risk  FRAX Risk Assessment  ICSI Preventive Guidelines  Dietary Guidelines for Americans, 2010  USDA's MyPlate  ASA Prophylaxis  Lung CA Screening    Brian Roman MD  [facility]    Identified Health Risks:

## 2022-07-11 NOTE — LETTER
July 12, 2022      Lilli Russo  [address] [facility] [address]        Dear ,    We are writing to inform you of your test results.    Thyroid is good, no changes needed.       Resulted Orders   TSH WITH FREE T4 REFLEX   Result Value Ref Range    TSH 3.28 0.40 - 4.00 mU/L       If you have any questions or concerns, please call the clinic at the number listed above.       Sincerely,    Brian Roman MD

## 2022-09-22 ENCOUNTER — TELEPHONE (OUTPATIENT)
Dept: INTERNAL MEDICINE | Facility: CLINIC | Age: 82
End: 2022-09-22

## 2022-09-22 NOTE — LETTER
[facility]  [address] [facility] [address]  Phone: [phone]    September 26, 2022        Lilli Russo  [address] [facility] [address]          To whom it may concern:    RE: Lilli Russo    Due to this patient's medical condition she has a poor ability to get her mail.  This is especially difficult with weather hazards.  Please have the [employer] post office delivering mail to her house instead of the end of her driveway due to medical conditions.    Please contact me for questions or concerns.      Sincerely,        Brian Roman MD

## 2022-09-22 NOTE — TELEPHONE ENCOUNTER
Forms/Letter Request    Type of form/letter: Letter for post office Pt would like a letter stating that due to mobility and winter conditions it would help keep Lilli safe by delivering her mail to her front door instead of the mailbox as she doesn't have anyone to help her in times of bad weather.     When is form/letter needed by: next week sometime    How would you like the form/letter returned: Mail and email to [email or web address]     Patient Notified form requests are processed in 3-5 business days:Yes    Okay to leave a detailed message?: Yes at Cell number on file:    No relevant phone numbers on file.

## 2022-11-25 NOTE — PROGRESS NOTES
Hematology/Oncology Visit    Oncologist: Dr. Surinder Perez  Diagnosis: intravascular large B-cell lymphoma  Reason for visit: yearly follow-up    Cancer and Treatment Hx:  Sep 2013: presented with fever/chills, fatigue, cough, night sweats, and early satiety. Found to be pancytopenic (WBC 2.4, Hgb 10.2, platelets 74). CT CAP with mild splenomegaly. Infectious work-up negative. 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 CSF negative and brain MRI without involvement.   Sep- Jan 2014: MR-CHOP x6 cycles (prophylactic IT methotrexate), repeat BMBx with CR  May 2014: peripheral blood stem cell transplant with BEAM prep    Interval History:  Lilli presents with her daughter Bella who is in from [place]. Lilli's health has generally been stable since last oncology appt. She denies any soaking night sweats, lymphadenopathy, early satiety, unintentional weight loss, new pain, or fevers. Sometimes takes her a while to get going in the morning and appetite isn't what it used to be but energy level is good. Her  passed away within the past year and so did her cat. [place] closer to friends or to [place] near her daughter. She is wondering if she can get a support animal and plans to address in the future with her primary care provider. Discussed labs. She has no other questions or concerns today.    Medications, imaging, and labs:  Reviewed pertinent medications, no refills needed today.    Reviewed labs from earlier today:   11/29/22   Creatinine 0.71   GFR Estimate 84   Alkaline Phosphatase 65   ALT 21   AST 21   Bilirubin Total 0.5   Lactate Dehydrogenase 190   WBC 6.4   Hemoglobin 13.7   Platelet Count 159   Absolute Neutrophils 3.5     Reviewed read of CT CAP from 6/16/20:  IMPRESSION:  1. No etiology for patient's weight loss is identified.  2. Intrahepatic and extrahepatic biliary ductal dilatation is likely  compensatory status post prior cholecystectomy. This is  "similar to the  prior study dated 12/17/2019.  3. Colonic diverticulosis.  4. No evidence for recurrent lymphoma is seen.    Physical Exam:  GEN: pleasantly conversant thin female in no acute distress  SKIN: generally intact, no visible rash, bruising, or sores   ENT: eyes non-icteric, no notable oral sores  LYMPH: no notable cervical, supraclavicular, axillary, or inguinal lymphadenopathy  RESP: clear to auscultation bilaterally, on room air  CARDS: regular rate and rhythm, no notable murmurs   GI: abd soft without notable hepatosplenomegaly, non-tender to palpation  MSK: no notable lower extremity edema  NEURO: alert and oriented without obvious focal deficit, gait stable  /62   Pulse 82   Temp (!) 96.7  F (35.9  C) (Temporal)   Resp 16   Ht 1.473 m (4' 10\")   Wt 36.3 kg (80 lb)   SpO2 96%   BMI 16.72 kg/m       Wt Readings from Last 4 Encounters:   11/29/22 36.3 kg (80 lb)   07/11/22 35.1 kg (77 lb 4.8 oz)   11/30/21 37.4 kg (82 lb 7 oz)   06/21/21 38.1 kg (84 lb)     Assessment and Plan:  Intravascular large B-cell lymphoma  - Status post PBSCTx in May 2014 with remission  - No current clinical or laboratory evidence of disease recurrence  - No current B-symptoms  - Continue lab and oncology provider follow-up yearly, sooner if concerns      The total time of this encounter amounted to 30 minutes today. This time includes face-to-face time spent with the patient, prep work, ordering tests, and performing post-visit documentation.  - Gertrude Salvador, CNP      "

## 2022-11-29 ENCOUNTER — LAB (OUTPATIENT)
Dept: LAB | Facility: CLINIC | Age: 82
End: 2022-11-29
Payer: MEDICARE

## 2022-11-29 ENCOUNTER — ONCOLOGY VISIT (OUTPATIENT)
Dept: ONCOLOGY | Facility: CLINIC | Age: 82
End: 2022-11-29
Payer: MEDICARE

## 2022-11-29 VITALS
HEIGHT: 58 IN | TEMPERATURE: 96.7 F | RESPIRATION RATE: 16 BRPM | DIASTOLIC BLOOD PRESSURE: 62 MMHG | OXYGEN SATURATION: 96 % | WEIGHT: 80 LBS | SYSTOLIC BLOOD PRESSURE: 100 MMHG | BODY MASS INDEX: 16.79 KG/M2 | HEART RATE: 82 BPM

## 2022-11-29 DIAGNOSIS — Z94.81 STATUS POST BONE MARROW TRANSPLANT (H): ICD-10-CM

## 2022-11-29 DIAGNOSIS — C83.30 DIFFUSE LARGE B-CELL LYMPHOMA, UNSPECIFIED BODY REGION (H): Primary | ICD-10-CM

## 2022-11-29 DIAGNOSIS — C83.30 DIFFUSE LARGE B-CELL LYMPHOMA, UNSPECIFIED BODY REGION (H): ICD-10-CM

## 2022-11-29 LAB
ALBUMIN SERPL-MCNC: 3.9 G/DL (ref 3.4–5)
ALP SERPL-CCNC: 65 U/L (ref 40–150)
ALT SERPL W P-5'-P-CCNC: 21 U/L (ref 0–50)
ANION GAP SERPL CALCULATED.3IONS-SCNC: 1 MMOL/L (ref 3–14)
AST SERPL W P-5'-P-CCNC: 21 U/L (ref 0–45)
BASOPHILS # BLD AUTO: 0 10E3/UL (ref 0–0.2)
BASOPHILS NFR BLD AUTO: 0 %
BILIRUB SERPL-MCNC: 0.5 MG/DL (ref 0.2–1.3)
BUN SERPL-MCNC: 17 MG/DL (ref 7–30)
CALCIUM SERPL-MCNC: 8.9 MG/DL (ref 8.5–10.1)
CHLORIDE BLD-SCNC: 109 MMOL/L (ref 94–109)
CO2 SERPL-SCNC: 31 MMOL/L (ref 20–32)
CREAT SERPL-MCNC: 0.71 MG/DL (ref 0.52–1.04)
EOSINOPHIL # BLD AUTO: 0 10E3/UL (ref 0–0.7)
EOSINOPHIL NFR BLD AUTO: 1 %
ERYTHROCYTE [DISTWIDTH] IN BLOOD BY AUTOMATED COUNT: 12.2 % (ref 10–15)
GFR SERPL CREATININE-BSD FRML MDRD: 84 ML/MIN/1.73M2
GLUCOSE BLD-MCNC: 106 MG/DL (ref 70–99)
HCT VFR BLD AUTO: 41.6 % (ref 35–47)
HGB BLD-MCNC: 13.7 G/DL (ref 11.7–15.7)
IMM GRANULOCYTES # BLD: 0 10E3/UL
IMM GRANULOCYTES NFR BLD: 0 %
LDH SERPL L TO P-CCNC: 190 U/L (ref 81–234)
LYMPHOCYTES # BLD AUTO: 2.2 10E3/UL (ref 0.8–5.3)
LYMPHOCYTES NFR BLD AUTO: 35 %
MCH RBC QN AUTO: 32.9 PG (ref 26.5–33)
MCHC RBC AUTO-ENTMCNC: 32.9 G/DL (ref 31.5–36.5)
MCV RBC AUTO: 100 FL (ref 78–100)
MONOCYTES # BLD AUTO: 0.6 10E3/UL (ref 0–1.3)
MONOCYTES NFR BLD AUTO: 9 %
NEUTROPHILS # BLD AUTO: 3.5 10E3/UL (ref 1.6–8.3)
NEUTROPHILS NFR BLD AUTO: 55 %
NRBC # BLD AUTO: 0 10E3/UL
NRBC BLD AUTO-RTO: 0 /100
PLATELET # BLD AUTO: 159 10E3/UL (ref 150–450)
POTASSIUM BLD-SCNC: 4.2 MMOL/L (ref 3.4–5.3)
PROT SERPL-MCNC: 7 G/DL (ref 6.8–8.8)
RBC # BLD AUTO: 4.17 10E6/UL (ref 3.8–5.2)
SODIUM SERPL-SCNC: 141 MMOL/L (ref 133–144)
WBC # BLD AUTO: 6.4 10E3/UL (ref 4–11)

## 2022-11-29 PROCEDURE — 36415 COLL VENOUS BLD VENIPUNCTURE: CPT

## 2022-11-29 PROCEDURE — 83615 LACTATE (LD) (LDH) ENZYME: CPT | Performed by: NURSE PRACTITIONER

## 2022-11-29 PROCEDURE — 85025 COMPLETE CBC W/AUTO DIFF WBC: CPT | Performed by: NURSE PRACTITIONER

## 2022-11-29 PROCEDURE — 80053 COMPREHEN METABOLIC PANEL: CPT | Performed by: NURSE PRACTITIONER

## 2022-11-29 PROCEDURE — 99214 OFFICE O/P EST MOD 30 MIN: CPT | Performed by: NURSE PRACTITIONER

## 2022-11-29 ASSESSMENT — PAIN SCALES - GENERAL: PAINLEVEL: NO PAIN (0)

## 2022-11-29 NOTE — NURSING NOTE
Chief Complaint   Patient presents with     Oncology Clinic Visit     IP BMT Infection Prophylaxis AUTOLOGUS - Adult   OP BMT Disease Specific Lymphoma / CLL Allo (Non-Burkitt's / Non-Lymphoblastic)

## 2023-03-15 ENCOUNTER — APPOINTMENT (OUTPATIENT)
Dept: CT IMAGING | Facility: CLINIC | Age: 83
End: 2023-03-15
Attending: EMERGENCY MEDICINE
Payer: MEDICARE

## 2023-03-15 ENCOUNTER — HOSPITAL ENCOUNTER (EMERGENCY)
Facility: CLINIC | Age: 83
Discharge: HOME | End: 2023-03-15
Attending: EMERGENCY MEDICINE | Admitting: EMERGENCY MEDICINE
Payer: MEDICARE

## 2023-03-15 VITALS
WEIGHT: 82 LBS | TEMPERATURE: 98 F | OXYGEN SATURATION: 98 % | RESPIRATION RATE: 18 BRPM | BODY MASS INDEX: 17.14 KG/M2 | HEART RATE: 71 BPM | SYSTOLIC BLOOD PRESSURE: 143 MMHG | DIASTOLIC BLOOD PRESSURE: 71 MMHG

## 2023-03-15 DIAGNOSIS — S32.050A CLOSED COMPRESSION FRACTURE OF L5 LUMBAR VERTEBRA, INITIAL ENCOUNTER (H): ICD-10-CM

## 2023-03-15 PROCEDURE — 99284 EMERGENCY DEPT VISIT MOD MDM: CPT | Mod: 25 | Performed by: EMERGENCY MEDICINE

## 2023-03-15 PROCEDURE — G1010 CDSM STANSON: HCPCS

## 2023-03-15 PROCEDURE — 250N000013 HC RX MED GY IP 250 OP 250 PS 637: Performed by: EMERGENCY MEDICINE

## 2023-03-15 PROCEDURE — 99284 EMERGENCY DEPT VISIT MOD MDM: CPT | Performed by: EMERGENCY MEDICINE

## 2023-03-15 RX ORDER — OXYCODONE HYDROCHLORIDE 5 MG/1
2.5-5 TABLET ORAL EVERY 6 HOURS PRN
Qty: 12 TABLET | Refills: 0 | Status: SHIPPED | OUTPATIENT
Start: 2023-03-15 | End: 2023-03-18

## 2023-03-15 RX ORDER — PREDNISONE 10 MG/1
TABLET ORAL
Qty: 20 TABLET | Refills: 0 | Status: SHIPPED | OUTPATIENT
Start: 2023-03-15

## 2023-03-15 RX ADMIN — OXYCODONE HYDROCHLORIDE 2.5 MG: 5 TABLET ORAL at 18:58

## 2023-03-15 ASSESSMENT — ACTIVITIES OF DAILY LIVING (ADL)
ADLS_ACUITY_SCORE: 37
ADLS_ACUITY_SCORE: 37

## 2023-03-15 NOTE — DISCHARGE INSTRUCTIONS
Oxycodone if needed for severe pain.  It can cause constipation so please take stool softener such as senna or MiraLAX as needed.    Prednisone as prescribed.  This is to decrease pain and inflammation.    You can try over-the-counter Biofreeze or lidocaine patches over areas of pain.    Referral sent for spine follow-up.  They should call you for an appointment.    Return at anytime for worsening, changes or concerns.    I hope that you start to improve very quickly!!

## 2023-03-15 NOTE — ED TRIAGE NOTES
Pt presents with left leg pain since the beginning of the month consistently and intermittently since before yolie from shoveling.     Triage Assessment     Row Name 03/15/23 1549       Triage Assessment (Adult)    Airway WDL WDL       Respiratory WDL    Respiratory WDL WDL       Cardiac WDL    Cardiac WDL WDL

## 2023-03-16 ENCOUNTER — TELEPHONE (OUTPATIENT)
Dept: NURSING | Facility: CLINIC | Age: 83
End: 2023-03-16
Payer: MEDICARE

## 2023-03-16 NOTE — TELEPHONE ENCOUNTER
SPINE PATIENTS - NEW PROTOCOL PREVISIT    RECORDS RECEIVED FROM: Internal   REASON FOR VISIT: Closed compression fracture of L5 lumbar vertebra   Date of Appt: 04/26/2023   NOTES (FOR ALL VISITS) STATUS DETAILS   OFFICE NOTE from referring provider N/A    OFFICE NOTE from other specialist N/A    DISCHARGE SUMMARY from hospital N/A    DISCHARGE REPORT from ER Internal 03/15/2023 [facility] ED    OPERATIVE REPORT N/A    EMG REPORT N/A    MEDICATION LIST N/A    IMAGING  (FOR ALL VISITS)     MRI (HEAD, NECK, SPINE) N/A    XRAY (SPINE) *NEUROSURGERY* N/A    CT (HEAD, NECK, SPINE) Internal 03/15/2023 lumbar spine, pelvis

## 2023-03-16 NOTE — TELEPHONE ENCOUNTER
Patient calling with general questions about her oxycodone. Went over instructions that she should take 2.5-5 mg every 6 hours as needed. Patient verbalized understanding. No triage.     ELSA CHOI RN

## 2023-04-19 NOTE — PROGRESS NOTES
"    SUBJECTIVE:  HPI:  Lilli Russo  Is a 82 year old female who presents today for new patient evaluation of low back pain and reported L5 compression fracture.  ED note dated 3/15/2023 documents:  \"This is an 82-year-old female, history of lymphoma status post bone marrow transplant, hypothyroidism, osteoporosis, GERD, other history as below presenting with leg pain.  Patient states that she has had a backache with some radiation into her leg since December when she was doing some shoveling.  Usually the pain would come and go and last just for a few hours.  However, since March 1 she has had pain every single day.  She does not know of any new or obvious inciting cause, no trauma.  Pain is much worse when she lays down at night.  She has been trying to just \"walk it off\".  However, she has had difficulty sleeping because of the pain.  She has been trying to manage with ibuprofen and Tylenol.  Pain is located primarily in the left lower back and buttock into the left leg.  She denies any fevers or chills.  No chest pain, shortness of breath, bowel or bladder changes.  No loss of bowel or bladder function.  No numbness or tingling in the leg.  No history of back issues, surgeries, injections.\"    ED pain diagram reveals tenderness over the left SI area increasing with range of motion of the left hip and pelvis.  No focal neurologic deficits were documented.    Lumbar CT 3/15/2023: L5 lower endplate compression fracture with retropulsed bone fragments resulting in mild to moderate central canal stenosis which appears to be acute or subacute    CT pelvic bones 3/15/2023: No acute pelvic fractures.  Healed old left superior and inferior pubic ramus fractures.  Diffuse degenerative changes in the pelvis and lumbar spine.    History today: Lilli says that she was living with some chronic back pain that she was noticed primarily at night.  Then because of the heavy snow this winter and the requirement that she [place] " off her front stairs, she started noticing increasing pain as she was doing more and more shoveling.  The pain was shooting down her left leg in an L5 distribution all the way to the foot.  It was so bad that she could not stand up.  That is what led her to the emergency department.  Now all of that acute pain in the back and in the left leg is gone.  She is back to her baseline of pre-January her nighttime discomfort.  There is no TLSO or physical therapy or other treatments.  She is not needing any pain medication at this time.  She specifically currently denies leg pain, numbness, tingling, bowel or bladder dysfunction, or saddle anesthesia.    She is here with her daughter Bella assisting with the history.    Pain score and diagram reviewed.  See questionnaire.      ROS: .  Otherwise negative for bowel/bladder dysfunction, balance changes, headache, leg pain/numbness/weakness, fevers, chills, night sweats, unexplained weight loss;  otherwise unremarkable.   See the patient's intake questionnaire from today for details.    Treatment to Date: As above    MEDICATIONS:  Reviewed.    ALLERGIES:  Reviewed.     Reviewed past medical, surgical, and family history.    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  Her   last year.  She had 1 adopted daughter.  She has 1 grand son.  Sports hobbies and activities she does light resistance training and some floor exercises and she can cleans her house and does some walking.  She will be moving to an apartment in next she will not have to shovel snow anymore.      OBJECTIVE:    --CONSTITUTIONAL:   No acute distress.  The patient is well nourished and well groomed.  Diminutive build with normal BMI.  She transitions well and moves fluidly about the room with a shortened  gait  --PSYCHIATRIC:  Appropriate mood and affect. The patient is awake, alert, oriented to person, place, time and answering questions appropriately with clear speech.    --SKIN:  Skin over the face, bilateral lower extremities, and posterior torso is clean, dry, intact without rashes.  Erythema ab igne throughout the posterior torso  --RESPIRATORY: Normal respiratory excursion and effort, and no dyspnea.   --GAIT:  is non-antalgic. Flat foot, heel and toe walking:  normal   .  Squat and rise   half range normal    she uses my hands for support but has good strength.  --STANDING EXAMINATION:    Symmetry of spine/pelvis   unremarkable   .      Range of motion full fluid and painless in flexion, extension, bilateral sidebending and bilateral rotation.   Standing flexion   negative   .    Rosangela's sign   negative    .     Stork test   negative    .   --NEUROLOGICAL:     ROMBERG normal, TANDEM WALK aborted for safety, PRONATOR DRIFT:   Normal.   .  SENSATION to light touch is intact in bilateral thighs, lower legs and feet.   REFLEXES:  patellar 2+, and achilles 2+.  Babinski is negative. No clonus.  MANUAL MOTOR TESTING:  L3- S1 Myotomes, Femoral, Obturator, Peroneal and Tibial nerves 5/5   DURAL STRETCH TESTS:  SLR negative .  Femoral Stretch Test not done.   Percussion of the lumbar spine nontender     IMAGING: Images and reports reviewed.    CT LUMBAR SPINE W/O CONTRAST 3/15/2023     L3-L4: Disc bulge. Facet ligamentous degenerative and hypertrophic changes. Moderate moderate central canal stenosis. Patent foramina.     L4-L5: Broad-based disc bulge. Moderate facet ligamentous degenerative and hypertrophic changes. Moderate to severe central canal stenosis. Borderline narrowing of the neural foramina.     L5-S1: Retropulsed bone from L5 fracture. Broad-based disc bulge. Facet ligamentous degenerative changes. Mild-to-moderate central canal stenosis. Moderate bilateral foraminal stenosis with bilateral L5 ganglion  contact.                                                                      IMPRESSION:  1.  L5 lower endplate compression fracture with bone retropulsion. Appears acute to subacute. Osteoporotic. No posterior element involvement or evidence for unstable fracture configuration.     2.  Moderate, multilevel mid and lower lumbar spondylosis described level by level above.    CT PELVIS BONE WO CONTRAST: 3/15/2023     COMPARISON: CT abdomen and pelvis from 06/16/2020    BONES AND JOINTS:  -Acute appearing compression fracture at the L5 inferior endplate. Moderate to severe degenerative changes of the lower lumbar spine.  -There is normal joint alignment. Remote healed left superior and inferior pubic ramus fractures. No significant joint effusion. Mild bilateral hip joint degenerative changes. Moderate bilateral sacroiliac joint degenerative changes. Osteopenia.                                                                     IMPRESSION:  1.  Acute appearing compression fracture at the L5 inferior endplate. Please refer to the contemporaneous lumbar spine CT for further details.  2.  Remote healed left superior and inferior pubic ramus fractures.   3.  Degenerative changes throughout the pelvis and lower lumbar spine.  4.  Other ancillary findings as described above.    ASSESSMENT: Lilli Russo is a 82 year old female who presents  today for new patient evaluation of:      Osteoporotic L5 inferior vertebral endplate compression fracture with retropulsion and left L5 radiculopathy.  Her pain has resolved and her radicular symptoms have resolved just with the passage of time      PLAN:  I advised that she should not do any more snow shoveling and I will refer her to the bone density clinic.  No treatment is needed for the fracture which has resolved and no further imaging is necessary.    Advised patient to call or return early if symptoms worsen, or having problems controlling bladder and bowel function or worsening  leg weakness.     Please note: Voice recognition software was used in this dictation.  It may therefore contain typographical errors.    Cosmo Steven MD

## 2023-04-26 ENCOUNTER — PRE VISIT (OUTPATIENT)
Dept: NEUROSURGERY | Facility: CLINIC | Age: 83
End: 2023-04-26

## 2023-04-26 ENCOUNTER — OFFICE VISIT (OUTPATIENT)
Dept: NEUROSURGERY | Facility: CLINIC | Age: 83
End: 2023-04-26
Attending: EMERGENCY MEDICINE
Payer: MEDICARE

## 2023-04-26 VITALS
HEART RATE: 75 BPM | BODY MASS INDEX: 16.82 KG/M2 | SYSTOLIC BLOOD PRESSURE: 117 MMHG | WEIGHT: 80.5 LBS | DIASTOLIC BLOOD PRESSURE: 75 MMHG

## 2023-04-26 DIAGNOSIS — M80.00XA AGE-RELATED OSTEOPOROSIS WITH CURRENT PATHOLOGICAL FRACTURE, INITIAL ENCOUNTER: Primary | ICD-10-CM

## 2023-04-26 DIAGNOSIS — S32.050A CLOSED COMPRESSION FRACTURE OF L5 LUMBAR VERTEBRA, INITIAL ENCOUNTER (H): ICD-10-CM

## 2023-04-26 PROCEDURE — 99204 OFFICE O/P NEW MOD 45 MIN: CPT | Performed by: PREVENTIVE MEDICINE

## 2023-04-26 NOTE — LETTER
"    4/26/2023         RE: Lilli Russo  [address]  [facility] [address]        Dear Colleague,    Thank you for referring your patient, Lilli Russo, to the [facility] [place]. Please see a copy of my visit note below.        SUBJECTIVE:  HPI:  Lilli Russo  Is a 82 year old female who presents today for new patient evaluation of low back pain and reported L5 compression fracture.  ED note dated 3/15/2023 documents:  \"This is an 82-year-old female, history of lymphoma status post bone marrow transplant, hypothyroidism, osteoporosis, GERD, other history as below presenting with leg pain.  Patient states that she has had a backache with some radiation into her leg since December when she was doing some shoveling.  Usually the pain would come and go and last just for a few hours.  However, since March 1 she has had pain every single day.  She does not know of any new or obvious inciting cause, no trauma.  Pain is much worse when she lays down at night.  She has been trying to just \"walk it off\".  However, she has had difficulty sleeping because of the pain.  She has been trying to manage with ibuprofen and Tylenol.  Pain is located primarily in the left lower back and buttock into the left leg.  She denies any fevers or chills.  No chest pain, shortness of breath, bowel or bladder changes.  No loss of bowel or bladder function.  No numbness or tingling in the leg.  No history of back issues, surgeries, injections.\"    ED pain diagram reveals tenderness over the left SI area increasing with range of motion of the left hip and pelvis.  No focal neurologic deficits were documented.    Lumbar CT 3/15/2023: L5 lower endplate compression fracture with retropulsed bone fragments resulting in mild to moderate central canal stenosis which appears to be acute or subacute    CT pelvic bones 3/15/2023: No acute pelvic fractures.  Healed old left superior and inferior pubic ramus fractures.  " Diffuse degenerative changes in the pelvis and lumbar spine.    History today: Lilli says that she was living with some chronic back pain that she was noticed primarily at night.  Then because of the heavy snow this winter and the requirement that she [place] off her front stairs, she started noticing increasing pain as she was doing more and more shoveling.  The pain was shooting down her left leg in an L5 distribution all the way to the foot.  It was so bad that she could not stand up.  That is what led her to the emergency department.  Now all of that acute pain in the back and in the left leg is gone.  She is back to her baseline of pre-January her nighttime discomfort.  There is no TLSO or physical therapy or other treatments.  She is not needing any pain medication at this time.  She specifically currently denies leg pain, numbness, tingling, bowel or bladder dysfunction, or saddle anesthesia.    She is here with her daughter Bella assisting with the history.    Pain score and diagram reviewed.  See questionnaire.      ROS: .  Otherwise negative for bowel/bladder dysfunction, balance changes, headache, leg pain/numbness/weakness, fevers, chills, night sweats, unexplained weight loss;  otherwise unremarkable.   See the patient's intake questionnaire from today for details.    Treatment to Date: As above    MEDICATIONS:  Reviewed.    ALLERGIES:  Reviewed.     Reviewed past medical, surgical, and family history.    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  Her   last year.  She had 1 adopted daughter.  She has 1 grand son.  Sports hobbies and activities she does light resistance training and some floor exercises and she can cleans her house and does some walking.  She will be moving to an apartment in  next she will not have to shovel snow anymore.      OBJECTIVE:    --CONSTITUTIONAL:   No acute distress.  The patient is well nourished and well groomed.  Diminutive build with normal BMI.  She transitions well and moves fluidly about the room with a shortened gait  --PSYCHIATRIC:  Appropriate mood and affect. The patient is awake, alert, oriented to person, place, time and answering questions appropriately with clear speech.    --SKIN:  Skin over the face, bilateral lower extremities, and posterior torso is clean, dry, intact without rashes.  Erythema ab igne throughout the posterior torso  --RESPIRATORY: Normal respiratory excursion and effort, and no dyspnea.   --GAIT:  is non-antalgic. Flat foot, heel and toe walking:  normal   .  Squat and rise   half range normal    she uses my hands for support but has good strength.  --STANDING EXAMINATION:    Symmetry of spine/pelvis   unremarkable   .      Range of motion full fluid and painless in flexion, extension, bilateral sidebending and bilateral rotation.   Standing flexion   negative   .    Rosangela's sign   negative    .     Stork test   negative    .   --NEUROLOGICAL:     ROMBERG normal, TANDEM WALK aborted for safety, PRONATOR DRIFT:   Normal.   .  SENSATION to light touch is intact in bilateral thighs, lower legs and feet.   REFLEXES:  patellar 2+, and achilles 2+.  Babinski is negative. No clonus.  MANUAL MOTOR TESTING:  L3- S1 Myotomes, Femoral, Obturator, Peroneal and Tibial nerves 5/5   DURAL STRETCH TESTS:  SLR negative .  Femoral Stretch Test not done.   Percussion of the lumbar spine nontender     IMAGING: Images and reports reviewed.    CT LUMBAR SPINE W/O CONTRAST 3/15/2023     L3-L4: Disc bulge. Facet ligamentous degenerative and hypertrophic changes. Moderate moderate central canal stenosis. Patent foramina.     L4-L5: Broad-based disc bulge. Moderate facet ligamentous degenerative and hypertrophic changes. Moderate to severe central canal stenosis.  Borderline narrowing of the neural foramina.     L5-S1: Retropulsed bone from L5 fracture. Broad-based disc bulge. Facet ligamentous degenerative changes. Mild-to-moderate central canal stenosis. Moderate bilateral foraminal stenosis with bilateral L5 ganglion contact.                                                                      IMPRESSION:  1.  L5 lower endplate compression fracture with bone retropulsion. Appears acute to subacute. Osteoporotic. No posterior element involvement or evidence for unstable fracture configuration.     2.  Moderate, multilevel mid and lower lumbar spondylosis described level by level above.    CT PELVIS BONE WO CONTRAST: 3/15/2023     COMPARISON: CT abdomen and pelvis from 06/16/2020    BONES AND JOINTS:  -Acute appearing compression fracture at the L5 inferior endplate. Moderate to severe degenerative changes of the lower lumbar spine.  -There is normal joint alignment. Remote healed left superior and inferior pubic ramus fractures. No significant joint effusion. Mild bilateral hip joint degenerative changes. Moderate bilateral sacroiliac joint degenerative changes. Osteopenia.                                                                     IMPRESSION:  1.  Acute appearing compression fracture at the L5 inferior endplate. Please refer to the contemporaneous lumbar spine CT for further details.  2.  Remote healed left superior and inferior pubic ramus fractures.   3.  Degenerative changes throughout the pelvis and lower lumbar spine.  4.  Other ancillary findings as described above.    ASSESSMENT: Lilli Russo is a 82 year old female who presents  today for new patient evaluation of:    Osteoporotic L5 inferior vertebral endplate compression fracture with retropulsion and left L5 radiculopathy.  Her pain has resolved and her radicular symptoms have resolved just with the passage of time      PLAN:  I advised that she should not do any more snow shoveling and I will refer her  to the bone density clinic.  No treatment is needed for the fracture which has resolved and no further imaging is necessary.    Advised patient to call or return early if symptoms worsen, or having problems controlling bladder and bowel function or worsening leg weakness.     Please note: Voice recognition software was used in this dictation.  It may therefore contain typographical errors.    Cosmo Steven MD             Again, thank you for allowing me to participate in the care of your patient.        Sincerely,        Cosmo Steven MD

## 2023-04-26 NOTE — PATIENT INSTRUCTIONS
Moderate was pam to meet you and I am glad that you are feeling better to the point where we do not need to treat your fracture anymore.  I am going to send you to the bone density clinic and have them evaluate you for your osteoporosis to see if anything further should be done to prevent further fractures.  See the assessment and plan below for further details of our discussion today.    ASSESSMENT: Lilli Russo is a 82 year old female who presents  today for new patient evaluation of:    Osteoporotic L5 inferior vertebral endplate compression fracture with retropulsion and left L5 radiculopathy.  Her pain has resolved and her radicular symptoms have resolved just with the passage of time      PLAN:  I will write a letter to her landlord that she should never do any more snow shoveling and I will refer her to the bone density clinic.  No treatment is needed for the fracture which has resolved and no further imaging is necessary.

## 2023-04-26 NOTE — NURSING NOTE
Reason For Visit:   Chief Complaint   Patient presents with     New Patient     Closed compression fracture of L5 lumbar          Occupation: business admin, retired  Currently working? No.  Work status?  Retired.    Sports: no  Activities: clean house, exercise with light weights/floor work             /75 (BP Location: Right arm, Patient Position: Sitting, Cuff Size: Adult Regular)   Pulse 75   Wt 36.5 kg (80 lb 8 oz)   BMI 16.82 kg/m        Allergies   Allergen Reactions     Ceftazidime Rash     Urticaria       Current Outpatient Medications   Medication     acetaminophen (TYLENOL) 325 MG tablet     Calcium Carb-Cholecalciferol (CALCIUM 600 + D PO)     calcium carbonate (TUMS) 500 MG chewable tablet     levothyroxine (SYNTHROID/LEVOTHROID) 50 MCG tablet     MULTIVITAMIN TABS   OR     predniSONE (DELTASONE) 10 MG tablet     No current facility-administered medications for this visit.         Chloe Sim LPN

## 2023-10-02 NOTE — ED PROVIDER NOTES
"  History     Chief Complaint   Patient presents with     Leg Pain     HPI  History per patient, medical records    This is an 82-year-old female, history of lymphoma status post bone marrow transplant, hypothyroidism, osteoporosis, GERD, other history as below presenting with leg pain.  Patient states that she has had a backache with some radiation into her leg since December when she was doing some shoveling.  Usually the pain would come and go and last just for a few hours.  However, since March 1 she has had pain every single day.  She does not know of any new or obvious inciting cause, no trauma.  Pain is much worse when she lays down at night.  She has been trying to just \"walk it off\".  However, she has had difficulty sleeping because of the pain.  She has been trying to manage with ibuprofen and Tylenol.  Pain is located primarily in the left lower back and buttock into the left leg.  She denies any fevers or chills.  No chest pain, shortness of breath, bowel or bladder changes.  No loss of bowel or bladder function.  No numbness or tingling in the leg.  No history of back issues, surgeries, injections.    Allergies:  Allergies   Allergen Reactions     Ceftazidime Rash     Urticaria       Problem List:    Patient Active Problem List    Diagnosis Date Noted     Caregiver stress 11/30/2021     Priority: Medium     Postoperative hypothyroidism 06/07/2016     Priority: Medium     Status post bone marrow transplant (H) 05/26/2015     Priority: Medium     Health Care Home 11/22/2013     Priority: Medium     Status:  Declined  Care Coordinator:  Barbara Davenport RN    See Letters for [facility] Care Plan           Diffuse large B cell lymphoma (H) 10/30/2013     Priority: Medium     History of pneumothorax 10/30/2013     Priority: Medium     Advanced directives, counseling/discussion 10/30/2013     Priority: Medium     DVT prophylaxis 10/30/2013     Priority: Medium     Esophageal reflux 10/08/2013     Priority: Medium "     CARDIOVASCULAR SCREENING; LDL GOAL LESS THAN 160 10/31/2010     Priority: Medium     Encounter for long-term (current) use of medications 2010     Priority: Medium     Pelvic fracture (H) 2009     Priority: Medium     Family history of diabetes mellitus 2006     Priority: Medium     Family history of malignant neoplasm of breast 2006     Priority: Medium     Hypothyroidism 2005     Priority: Medium     Problem list name updated by automated process. Provider to review       Osteoporosis 2005     Priority: Medium     Problem list name updated by automated process. Provider to review          Past Medical History:    Past Medical History:   Diagnosis Date     Cancer (H) 2014     Macular degeneration (senile) of retina, unspecified      Osteoporosis, unspecified      Pure hypercholesterolemia      Unspecified closed fracture of pelvis 09     Unspecified hypothyroidism        Past Surgical History:    Past Surgical History:   Procedure Laterality Date      SECTION       CHOLECYSTECTOMY       INSERT CHEST TUBE  10/30/2013    Procedure: INSERT CHEST TUBE;  Left chest tube insertion;  Surgeon: Dawood Yeh MD;  Location: [facility]     INSERT PORT VASCULAR ACCESS  10/30/2013    Procedure: INSERT PORT VASCULAR ACCESS;  Power port placement for Chemotherapy;  Surgeon: Umesh Brown MD;  Location: [facility] OR     THYROID SURGERY      partial thyroidectomy       Family History:    Family History   Problem Relation Age of Onset     Eye Disorder Mother         glaucoma     Osteoporosis Mother      C.A.D. Father      Diabetes Father      Diabetes Sister      Diabetes Brother      Hypertension Sister      Breast Cancer Sister      Eye Disorder Sister         cataract     Eye Disorder Sister         cataract     Heart Disease Brother         by pass surg     Heart Disease Sister         heart attack     Neurologic Disorder Sister         tremors     Osteoporosis Sister       Osteoporosis Sister      Cancer Other         Cousin with lymphoma       Social History:  Marital Status:   [2]  Social History     Tobacco Use     Smoking status: Never     Smokeless tobacco: Never   Vaping Use     Vaping Use: Never used   Substance Use Topics     Alcohol use: No     Alcohol/week: 0.0 standard drinks     Drug use: No        Medications:    oxyCODONE (ROXICODONE) 5 MG tablet  predniSONE (DELTASONE) 10 MG tablet  acetaminophen (TYLENOL) 325 MG tablet  Calcium Carb-Cholecalciferol (CALCIUM 600 + D PO)  calcium carbonate (TUMS) 500 MG chewable tablet  levothyroxine (SYNTHROID/LEVOTHROID) 50 MCG tablet  MULTIVITAMIN TABS   OR          Review of Systems   All other ROS reviewed and are negative or non-contributory except as stated in HPI.     Physical Exam   BP: (!) 163/74  Pulse: 72  Temp: 98.2  F (36.8  C)  Resp: 16  Weight: 37.2 kg (82 lb)  SpO2: 99 %      Physical Exam  Vitals and nursing note reviewed.   Constitutional:       Appearance: Normal appearance.      Comments: Very petite, older female lying in the bed   HENT:      Head: Normocephalic.   Eyes:      General: No scleral icterus.     Extraocular Movements: Extraocular movements intact.      Conjunctiva/sclera: Conjunctivae normal.   Cardiovascular:      Rate and Rhythm: Normal rate and regular rhythm.      Pulses: Normal pulses.      Heart sounds: Normal heart sounds.   Pulmonary:      Effort: Pulmonary effort is normal.      Breath sounds: Normal breath sounds.   Abdominal:      Palpations: Abdomen is soft.      Tenderness: There is no abdominal tenderness.   Musculoskeletal:         General: No swelling.      Cervical back: Normal range of motion and neck supple.        Back:       Right lower leg: No edema.      Left lower leg: No edema.   Skin:     General: Skin is warm and dry.   Neurological:      General: No focal deficit present.      Mental Status: She is alert and oriented to person, place, and time.   Psychiatric:          Mood and Affect: Mood normal.         Behavior: Behavior normal.         ED Course (with Medical Decision Making)    Pt seen and examined by me.  RN and EPIC notes reviewed.       Patient with low back pain.  She believes this started in December but worsened since March 1.  She has history of lymphoma and bone marrow transplant.  She has osteoporosis and is a very tiny lady.  At high risk for compression fracture, fracture in general.  I am going to CT her pelvis and lumbar spine.    Patient appears to have an acute/subacute compression fracture at the L5 inferior endplate with bone retropulsion.  She has a lot of other degenerative changes are noted.    Results discussed at length with the patient and her friend.  I am going to refer her to see the spine specialists.  She was given some oxycodone to use if needed for severe pain.  She can continue the over-the-counter medications.    If it anytime she has any significant worsening, changes or concerns return promptly at any time.      Procedures    Results for orders placed or performed during the hospital encounter of 03/15/23 (from the past 24 hour(s))   Lumbar spine CT w/o contrast    Narrative    EXAM: CT LUMBAR SPINE W/O CONTRAST  LOCATION: [facility]  DATE/TIME: 3/15/2023 5:14 PM    INDICATION: Low back pain; Low back pain, no complicating feature; No chronic LBP duration >= 3 months  COMPARISON: None.  TECHNIQUE: Routine CT Lumbar Spine without IV contrast. Multiplanar reformats. Dose reduction techniques were used.    FINDINGS:  Nomenclature is based on 5 lumbar type vertebral bodies. Lower endplate compression fracture at L5 noted with 35% height loss. Mid and lower posterior cortical retropulsion of 3 mm. Mild central canal stenosis. Acute fracture lucency noted no underlying,   focal pathologic lesion. Bones osteopenic negative for paraspinous or epidural hematoma. The other lumbar vertebral body heights appear within  normal. No extraspinal soft tissue abnormality. Unremarkable visualized bony pelvis.    T12-L1: Normal disc height. No herniation. Normal facets. No spinal canal or neural foraminal stenosis.    L1-L2: Normal disc height. No herniation. Normal facets. No spinal canal or neural foraminal stenosis.    L2-L3: Normal disc height. No herniation. Normal facets. No spinal canal or neural foraminal stenosis.    L3-L4: Disc bulge. Facet ligamentous degenerative and hypertrophic changes. Moderate moderate central canal stenosis. Patent foramina.    L4-L5: Broad-based disc bulge. Moderate facet ligamentous degenerative and hypertrophic changes. Moderate to severe central canal stenosis. Borderline narrowing of the neural foramina.    L5-S1: Retropulsed bone from L5 fracture. Broad-based disc bulge. Facet ligamentous degenerative changes. Mild-to-moderate central canal stenosis. Moderate bilateral foraminal stenosis with bilateral L5 ganglion contact.      Impression    IMPRESSION:  1.  L5 lower endplate compression fracture with bone retropulsion. Appears acute to subacute. Osteoporotic. No posterior element involvement or evidence for unstable fracture configuration.    2.  Moderate, multilevel mid and lower lumbar spondylosis described level by level above.   CT Pelvis Bone wo Contrast    Narrative    EXAM: CT PELVIS BONE WO CONTRAST  LOCATION: [facility]  DATE/TIME: 3/15/2023 5:17 PM    INDICATION: Hip pain  COMPARISON: CT abdomen and pelvis from 06/16/2020  TECHNIQUE: CT scan of the pelvis was performed without IV contrast. Multiplanar reformats were obtained. Dose reduction techniques were used.  CONTRAST: None.    FINDINGS:    BONES AND JOINTS:  -Acute appearing compression fracture at the L5 inferior endplate. Moderate to severe degenerative changes of the lower lumbar spine.  -There is normal joint alignment. Remote healed left superior and inferior pubic ramus fractures. No significant  Detail Level: Detailed joint effusion. Mild bilateral hip joint degenerative changes. Moderate bilateral sacroiliac joint degenerative changes. Osteopenia.    SOFT TISSUES:   -No significant soft tissue swelling. No discrete fluid collection. Diffuse muscle atrophy within normal limits for age. Atherosclerosis. Colonic diverticulosis.      Impression    IMPRESSION:  1.  Acute appearing compression fracture at the L5 inferior endplate. Please refer to the contemporaneous lumbar spine CT for further details.  2.  Remote healed left superior and inferior pubic ramus fractures.   3.  Degenerative changes throughout the pelvis and lower lumbar spine.  4.  Other ancillary findings as described above.         Medications   oxyCODONE IR (ROXICODONE) half-tab 2.5 mg (2.5 mg Oral $Given 3/15/23 3329)       Assessments & Plan   I have reviewed the findings, diagnosis, plan and need for follow up with the patient.    Discharge Medication List as of 3/15/2023  6:26 PM      START taking these medications    Details   oxyCODONE (ROXICODONE) 5 MG tablet Take 0.5-1 tablets (2.5-5 mg) by mouth every 6 hours as needed for pain, Disp-12 tablet, R-0, E-Prescribe      predniSONE (DELTASONE) 10 MG tablet Take 4 tabs by mouth daily x 2 days, then 3 tabs daily x 2 days, then 2 tab daily x 2 days, then 1 tab daily x 2 days., Disp-20 tablet, R-0, E-Prescribe             Final diagnoses:   Closed compression fracture of L5 lumbar vertebra, initial encounter (H)     Disposition: Patient discharged home in stable condition.  Plan as above.  Return for concerns.     Note: Chart documentation done in part with Dragon Voice Recognition software. Although reviewed after completion, some word and grammatical errors may remain.     3/15/2023   [facility] EMERGENCY DEPT     Rosalina Ken MD  03/16/23 0222     Size Of Lesion: 4mm

## 2025-01-09 NOTE — ADDENDUM NOTE
Addended by: CORNELL MUSE on: 7/11/2022 03:06 PM     Modules accepted: Johann, SmartSet     LABS:  cret                        12.9   7.92  )-----------( 211      ( 09 Jan 2025 15:52 )             40.6     01-09    139  |  107  |  24[H]  ----------------------------<  121[H]  3.7   |  28  |  0.96    Ca    9.0      09 Jan 2025 15:52    TPro  8.1  /  Alb  3.8  /  TBili  0.2  /  DBili  x   /  AST  16  /  ALT  14  /  AlkPhos  104  01-09      < from: CT Cervical Spine No Cont (01.09.25 @ 14:29) >    BRAIN  IMPRESSION:    1)  chronic ischemic changes with atrophy. No acute abnormality suggested.  2)  no intracerebral hemorrhage, contusion, or extracerebral hemorrhagic   collections identified.    CERVICAL IMPRESSION:    Multilevel degenerative changes. No acute fracture identified.    --- End of Report ---    < end of copied text >